# Patient Record
Sex: FEMALE
[De-identification: names, ages, dates, MRNs, and addresses within clinical notes are randomized per-mention and may not be internally consistent; named-entity substitution may affect disease eponyms.]

---

## 2024-04-05 PROBLEM — Z00.00 ENCOUNTER FOR PREVENTIVE HEALTH EXAMINATION: Status: ACTIVE | Noted: 2024-04-05

## 2024-04-21 ENCOUNTER — NON-APPOINTMENT (OUTPATIENT)
Age: 54
End: 2024-04-21

## 2024-04-22 ENCOUNTER — APPOINTMENT (OUTPATIENT)
Dept: PULMONOLOGY | Facility: CLINIC | Age: 54
End: 2024-04-22
Payer: COMMERCIAL

## 2024-04-22 VITALS
HEART RATE: 62 BPM | DIASTOLIC BLOOD PRESSURE: 71 MMHG | SYSTOLIC BLOOD PRESSURE: 109 MMHG | WEIGHT: 170 LBS | HEIGHT: 63 IN | BODY MASS INDEX: 30.12 KG/M2 | OXYGEN SATURATION: 97 %

## 2024-04-22 LAB — POCT - HEMOGLOBIN (HGB), QUANTITATIVE, TRANSCUTANEOUS: 11

## 2024-04-22 PROCEDURE — ZZZZZ: CPT

## 2024-04-22 PROCEDURE — 94010 BREATHING CAPACITY TEST: CPT

## 2024-04-22 PROCEDURE — 94727 GAS DIL/WSHOT DETER LNG VOL: CPT

## 2024-04-22 PROCEDURE — 88738 HGB QUANT TRANSCUTANEOUS: CPT

## 2024-04-22 PROCEDURE — 99204 OFFICE O/P NEW MOD 45 MIN: CPT | Mod: 25

## 2024-04-22 PROCEDURE — 94729 DIFFUSING CAPACITY: CPT

## 2024-04-22 NOTE — HISTORY OF PRESENT ILLNESS
[Never] : never [TextBox_4] : SOCORRO LUQUE is a 53 year old female who presents with  for further eval on CT colonscop virtual at [facility] no significant medical history never smoker sometimes cough due to allergies never seen pulm  no dvt /p ehistory  no URI symptoms- prior to this CT colonoscopy  no prior CT chest

## 2024-04-22 NOTE — PROCEDURE
[FreeTextEntry1] : 4/2024- normal spirometry, mild high rv/tlc, normal dlco the labs were drawn in the office today unable to do niox  previous data reviewed: IMPRESSION:    3. LEFT LOWER LOBE 4.6 X 2.6 CM WEDGE-SHAPED OPACITY WITH A FEW AIR BRONCHOGRAMS MAY REPRESENT ACUTE PNEUMONIA OR POSSIBLY PULMONARY INFARCT BUT IS NONSPECIFIC. MILDER RIGHT LOWER LOBE SUSPECTED ATELECTASIS OR SCARRING. FINDINGS ARE MORE PROMINENT COMPARED TO CHEST X-RAY 10/4/2021 AND CLINICAL CORRELATION IS RECOMMENDED. CORRELATION WITH FINDINGS ON PRIOR OUTSIDE CROSS-SECTIONAL IMAGING IS RECOMMENDED IF AVAILABLE. IF NO PRIOR CT EXAMINATIONS ARE AVAILABLE, SHORT-TERM CHEST CT FOLLOW-UP IS RECOMMENDED (6-8 WEEKS) FOLLOWING ANTIBIOTIC THERAPY IF CLINICALLY APPROPRIATE.   IMPORTANT FINDING. THIS REPORT WILL BE FLAGGED (!) IN EPIC.   1. No colonic mass or evidence of polyp measuring 6 mm or larger. Mild colonic diverticulosis.   2. Thickened appendix (1.0 cm) with small likely appendicolith may represent chronic low-grade appendicitis. No acute inflammatory changes. Correlation with findings on prior outside imaging is recommended if available. Noncontrast imaging follow-up can be performed to assess for stability.     Clinical indication: Colorectal carcinoma screening. Incomplete colonoscopy to the level of the splenic flexure due to very tortuous colon.   Volumetric scanning of the abdomen and pelvis was performed utilizing a CT colonography protocol.  A rectal tube was placed and the colon was insufflated utilizing CO2 with safety valve pressure monitoring.  Scanning was then performed from the upper abdomen through the perineum utilizing low-dose technique in both the supine and prone positions. Multiplanar reformations are submitted for review supplemented by surface rendered 3D imaging performed on an independent workstation.   Comparison: None of this type. Chest x-ray 10/4/2021   Findings: Insufflation was well tolerated; there is good overall colonic distention when combining supine and prone views.  Small amount of residual mobile fluid/fecal debris is present within the colonic lumen; bowel preparation is good. There is mild motion artifact in the upper abdomen on supine imaging.   There is no colonic mass or evidence of polyp measuring 6 mm or larger. The ileocecal valve is normal. There is mild thickening of the appendix (10 mm) without surrounding inflammatory changes. Subcentimeter calcification distally may represent small appendicolith.   There is moderate colonic redundancy along the splenic flexure and involving the sigmoid colon. Mild colonic diverticulosis most pronounced region of the splenic flexure and proximal descending colon.   Additional Evaluation of non-colonic findings is significantly limited with this dedicated low-dose and noncontrast technique: 01. LIVER: Normal unenhanced. 02. SPLEEN: Normal unenhanced.  03. PANCREAS: Normal unenhanced. 04. GALLBLADDER/BILIARY TREE: No biliary duct dilatation. No radiopaque gallstone.  05. ADRENALS: Normal. 06. KIDNEYS: Symmetric in size.  No hydronephrosis or renal calculi. 07. LYMPHADENOPATHY/RETROPERITONEUM: No lymphadenopathy. 08. BOWEL: See above. The unopacified stomach and small bowel are grossly unremarkable.  09. PELVIC VISCERA: Anteverted uterus, adnexa and unopacified urinary bladder are grossly unremarkable. 10. PELVIC LYMPH NODES: No lymphadenopathy. 11. VASCULATURE: Normal caliber abdominal aorta. 12. PERITONEUM/ABDOMINAL WALL: No ascites. Scarring along the midline pelvic ventral wall.  13. SKELETAL: No aggressive osseous lesion. L1 butterfly vertebrae. 14. LUNG BASES: Left lower lobe wedge-shaped opacity with a few air bronchograms may represent pneumonia or possibly pulmonary infarct but is nonspecific (series 7, image 11). Milder suspected scarring or atelectasis in the medial basilar right lower lobe. No pleural effusion.   Electronic Signature: I personally reviewed the images and agree with this report. Final Report: Dictated by  and Signed by Attending Beny Martinez MD 4/1/2024 4:06 PM External Result Report previous data reviewed:  Signed	Date/Time	    Phone	Pager SAMMIE BRODERICK	Oct 6, 2021	 7:44 AM	[phone]	 Full Report IMPRESSION:    No radiographic evidence for acute cardiopulmonary disease.     Clinical indication: Cough   Technique: PA and lateral views of the chest    Comparison: None   Findings:   Mild atelectasis is present at the left lung base. There is a prominent left-sided epicardial fat.   The lungs are otherwise clear bilaterally without evidence of focal consolidation, pleural effusion or pneumothorax. The trachea is midline. The cardiomediastinal silhouette is within normal limits. The pulmonary vasculature is within normal limits.  No mediastinal adenopathy is identified. There is no aggressive osseous lytic or blastic lesion.  There are multilevel degenerative changes of the thoracic spine. Visualized portions of the abdomen appear unremarkable.   Electronic Signature: I personally reviewed the images and agree with this report. Final Report: Dictated by  and Signed by Attending Sammie Broderick MD 10/6/2021 7:44 AM External Result Report Result History

## 2024-04-23 LAB — DEPRECATED D DIMER PPP IA-ACNC: 156 NG/ML DDU

## 2024-05-20 ENCOUNTER — APPOINTMENT (OUTPATIENT)
Dept: PULMONOLOGY | Facility: CLINIC | Age: 54
End: 2024-05-20
Payer: COMMERCIAL

## 2024-05-20 VITALS
HEART RATE: 76 BPM | HEIGHT: 63 IN | OXYGEN SATURATION: 95 % | RESPIRATION RATE: 16 BRPM | DIASTOLIC BLOOD PRESSURE: 73 MMHG | SYSTOLIC BLOOD PRESSURE: 123 MMHG | BODY MASS INDEX: 29.77 KG/M2 | WEIGHT: 168 LBS

## 2024-05-20 PROCEDURE — 99214 OFFICE O/P EST MOD 30 MIN: CPT

## 2024-05-20 NOTE — PROCEDURE
[FreeTextEntry1] : 5/2024- [facility] CT chest miconodularity b/l perilymphatic, consolidation LLL lymph nodes also don/mediastinal   previous data reviewed:  4/2024- normal spirometry, mild high rv/tlc, normal dlco the labs were drawn in the office today unable to do niox  IMPRESSION:    3. LEFT LOWER LOBE 4.6 X 2.6 CM WEDGE-SHAPED OPACITY WITH A FEW AIR BRONCHOGRAMS MAY REPRESENT ACUTE PNEUMONIA OR POSSIBLY PULMONARY INFARCT BUT IS NONSPECIFIC. MILDER RIGHT LOWER LOBE SUSPECTED ATELECTASIS OR SCARRING. FINDINGS ARE MORE PROMINENT COMPARED TO CHEST X-RAY 10/4/2021 AND CLINICAL CORRELATION IS RECOMMENDED. CORRELATION WITH FINDINGS ON PRIOR OUTSIDE CROSS-SECTIONAL IMAGING IS RECOMMENDED IF AVAILABLE. IF NO PRIOR CT EXAMINATIONS ARE AVAILABLE, SHORT-TERM CHEST CT FOLLOW-UP IS RECOMMENDED (6-8 WEEKS) FOLLOWING ANTIBIOTIC THERAPY IF CLINICALLY APPROPRIATE.   IMPORTANT FINDING. THIS REPORT WILL BE FLAGGED (!) IN EPIC.   1. No colonic mass or evidence of polyp measuring 6 mm or larger. Mild colonic diverticulosis.   2. Thickened appendix (1.0 cm) with small likely appendicolith may represent chronic low-grade appendicitis. No acute inflammatory changes. Correlation with findings on prior outside imaging is recommended if available. Noncontrast imaging follow-up can be performed to assess for stability.     Clinical indication: Colorectal carcinoma screening. Incomplete colonoscopy to the level of the splenic flexure due to very tortuous colon.   Volumetric scanning of the abdomen and pelvis was performed utilizing a CT colonography protocol.  A rectal tube was placed and the colon was insufflated utilizing CO2 with safety valve pressure monitoring.  Scanning was then performed from the upper abdomen through the perineum utilizing low-dose technique in both the supine and prone positions. Multiplanar reformations are submitted for review supplemented by surface rendered 3D imaging performed on an independent workstation.   Comparison: None of this type. Chest x-ray 10/4/2021   Findings: Insufflation was well tolerated; there is good overall colonic distention when combining supine and prone views.  Small amount of residual mobile fluid/fecal debris is present within the colonic lumen; bowel preparation is good. There is mild motion artifact in the upper abdomen on supine imaging.   There is no colonic mass or evidence of polyp measuring 6 mm or larger. The ileocecal valve is normal. There is mild thickening of the appendix (10 mm) without surrounding inflammatory changes. Subcentimeter calcification distally may represent small appendicolith.   There is moderate colonic redundancy along the splenic flexure and involving the sigmoid colon. Mild colonic diverticulosis most pronounced region of the splenic flexure and proximal descending colon.   Additional Evaluation of non-colonic findings is significantly limited with this dedicated low-dose and noncontrast technique: 01. LIVER: Normal unenhanced. 02. SPLEEN: Normal unenhanced.  03. PANCREAS: Normal unenhanced. 04. GALLBLADDER/BILIARY TREE: No biliary duct dilatation. No radiopaque gallstone.  05. ADRENALS: Normal. 06. KIDNEYS: Symmetric in size.  No hydronephrosis or renal calculi. 07. LYMPHADENOPATHY/RETROPERITONEUM: No lymphadenopathy. 08. BOWEL: See above. The unopacified stomach and small bowel are grossly unremarkable.  09. PELVIC VISCERA: Anteverted uterus, adnexa and unopacified urinary bladder are grossly unremarkable. 10. PELVIC LYMPH NODES: No lymphadenopathy. 11. VASCULATURE: Normal caliber abdominal aorta. 12. PERITONEUM/ABDOMINAL WALL: No ascites. Scarring along the midline pelvic ventral wall.  13. SKELETAL: No aggressive osseous lesion. L1 butterfly vertebrae. 14. LUNG BASES: Left lower lobe wedge-shaped opacity with a few air bronchograms may represent pneumonia or possibly pulmonary infarct but is nonspecific (series 7, image 11). Milder suspected scarring or atelectasis in the medial basilar right lower lobe. No pleural effusion.   Electronic Signature: I personally reviewed the images and agree with this report. Final Report: Dictated by  and Signed by Attending Beny Martinez MD 4/1/2024 4:06 PM External Result Report previous data reviewed:  Signed	Date/Time	    Phone	Pager SAMMIE BRODERICK	Oct 6, 2021	 7:44 AM	[phone]	 Full Report IMPRESSION:    No radiographic evidence for acute cardiopulmonary disease.     Clinical indication: Cough   Technique: PA and lateral views of the chest    Comparison: None   Findings:   Mild atelectasis is present at the left lung base. There is a prominent left-sided epicardial fat.   The lungs are otherwise clear bilaterally without evidence of focal consolidation, pleural effusion or pneumothorax. The trachea is midline. The cardiomediastinal silhouette is within normal limits. The pulmonary vasculature is within normal limits.  No mediastinal adenopathy is identified. There is no aggressive osseous lytic or blastic lesion.  There are multilevel degenerative changes of the thoracic spine. Visualized portions of the abdomen appear unremarkable.   Electronic Signature: I personally reviewed the images and agree with this report. Final Report: Dictated by  and Signed by Attending Sammie Broderick MD 10/6/2021 7:44 AM External Result Report Result History

## 2024-05-20 NOTE — HISTORY OF PRESENT ILLNESS
[Never] : never [TextBox_4] : SOCORRO LUQUE is a 53 year old female who presents with  for f/u on repeat CT chest she had a cT colonscopy done- abnormal chest findings  we repeated CT same facility here for result review   remains w/o resp complaints

## 2024-05-21 ENCOUNTER — NON-APPOINTMENT (OUTPATIENT)
Age: 54
End: 2024-05-21

## 2024-05-24 PROBLEM — R93.89 ABNORMAL CAT SCAN: Status: ACTIVE | Noted: 2024-04-22

## 2024-05-28 ENCOUNTER — APPOINTMENT (OUTPATIENT)
Dept: THORACIC SURGERY | Facility: CLINIC | Age: 54
End: 2024-05-28
Payer: COMMERCIAL

## 2024-05-28 VITALS
HEART RATE: 77 BPM | SYSTOLIC BLOOD PRESSURE: 123 MMHG | DIASTOLIC BLOOD PRESSURE: 73 MMHG | HEIGHT: 63 IN | RESPIRATION RATE: 16 BRPM | WEIGHT: 168 LBS | OXYGEN SATURATION: 96 % | BODY MASS INDEX: 29.77 KG/M2

## 2024-05-28 DIAGNOSIS — R93.89 ABNORMAL FINDINGS ON DIAGNOSTIC IMAGING OF OTHER SPECIFIED BODY STRUCTURES: ICD-10-CM

## 2024-05-28 DIAGNOSIS — R59.0 LOCALIZED ENLARGED LYMPH NODES: ICD-10-CM

## 2024-05-28 PROCEDURE — 99204 OFFICE O/P NEW MOD 45 MIN: CPT

## 2024-05-28 NOTE — PHYSICAL EXAM
[Fully active, able to carry on all pre-disease performance without restriction] : Status 0 - Fully active, able to carry on all pre-disease performance without restriction [General Appearance - Alert] : alert [General Appearance - In No Acute Distress] : in no acute distress [Sclera] : the sclera and conjunctiva were normal [Outer Ear] : the ears and nose were normal in appearance [Hearing Threshold Finger Rub Not [place]] : hearing was normal [Neck Appearance] : the appearance of the neck was normal [Auscultation Breath Sounds / Voice Sounds] : lungs were clear to auscultation bilaterally [Heart Rate And Rhythm] : heart rate was normal and rhythm regular [Examination Of The Chest] : the chest was normal in appearance [Chest Visual Inspection Thoracic Asymmetry] : no chest asymmetry [Diminished Respiratory Excursion] : normal chest expansion [2+] : left 2+ [Bowel Sounds] : normal bowel sounds [Abdomen Soft] : soft [Cervical Lymph Nodes Enlarged Posterior Bilaterally] : posterior cervical [Cervical Lymph Nodes Enlarged Anterior Bilaterally] : anterior cervical [No CVA Tenderness] : no ~M costovertebral angle tenderness [No Spinal Tenderness] : no spinal tenderness [Abnormal Walk] : normal gait [Nail Clubbing] : no clubbing  or cyanosis of the fingernails [Musculoskeletal - Swelling] : no joint swelling seen [Motor Tone] : muscle strength and tone were normal [Skin Color & Pigmentation] : normal skin color and pigmentation [Skin Turgor] : normal skin turgor [] : no rash [Deep Tendon Reflexes (DTR)] : deep tendon reflexes were 2+ and symmetric [Sensation] : the sensory exam was normal to light touch and pinprick [No Focal Deficits] : no focal deficits [Oriented To Time, Place, And Person] : oriented to person, place, and time [Impaired Insight] : insight and judgment were intact [Affect] : the affect was normal

## 2024-05-29 NOTE — CONSULT LETTER
[Dear  ___] : Dear  [unfilled], [Consult Letter:] : I had the pleasure of evaluating your patient, [unfilled]. [Please see my note below.] : Please see my note below. [Consult Closing:] : Thank you very much for allowing me to participate in the care of this patient.  If you have any questions, please do not hesitate to contact me. [Sincerely,] : Sincerely, [FreeTextEntry2] : Larry Kirkland DO (ref/pulm) [FreeTextEntry3] : Cynthia Flores MD Attending Surgeon Division of Thoracic Surgery , [facility] [employer] [facility]

## 2024-05-29 NOTE — HISTORY OF PRESENT ILLNESS
[FreeTextEntry1] : Ms. SOCORRO LUQUE, 53 year old female, never smoker, with no PMhx  who presented lung biopsy, suspect of sarcoidosis.   PFTs on 4/22/24: FVC 96%, FEV1 98%, DLCO 82%  CT chest on 3/25/2024: - left lower lobe 4.6x2.6cm wedge shaped opacity with a few air bronchograms may represent pneumonia or possibly pulmonary infract but is nonspecific. - milder right lower lobe suspected atelectasis or scarring.   CT Chest on 5/13/24: - scattered areas of perilymphatic micronodularity are identified bilaterally, involving RUL to a lesser extent the TATIANNA, with worse involvement of lower lobes in which there is associated interlocular septal thickening and a coalescent area of patchy consolidation in the LLL - numerous small calcified bilateral hilar and mediastinal LNs  Pt presents today for CT Sx consultation, referred by . Patient reports of productive cough, denies any SOB or CP.

## 2024-05-29 NOTE — ASSESSMENT
[FreeTextEntry1] : Ms. SOCORRO LUQUE, 53 year old female, never smoker, with no PMhx  who presented lung biopsy, suspect of sarcoidosis.   PFTs on 4/22/24: FVC 96%, FEV1 98%, DLCO 82%  CT chest on 3/25/2024: - left lower lobe 4.6x2.6cm wedge shaped opacity with a few air bronchograms may represent pneumonia or possibly pulmonary infract but is nonspecific. - milder right lower lobe suspected atelectasis or scarring.   CT Chest on 5/13/24: - scattered areas of perilymphatic micronodularity are identified bilaterally, involving RUL to a lesser extent the TATIANNA, with worse involvement of lower lobes in which there is associated interlocular septal thickening and a coalescent area of patchy consolidation in the LLL - numerous small calcified bilateral hilar and mediastinal LNs  I have reviewed the patient's medical records and diagnostic images at time of this office consultation and have made the following recommendation: 1. CT chest reviewed and discussed with patient, lung nodules and mediastinal lymph nodes with unclear etiology. Recommended R.A Rt. VATS wedge biopsy and mediastinal lymph node biopsy for diagnostic and therapeurtic purposes. Risks of surgery includes but not limited to pain, infection, bleeding, injuries to surrounding structures, and need for further procedure, stroke or death. Benefits and alternatives explained to patient, all questions answered, patient agreed to proceed with surgery. 2. Cardiac clearance and PST prior to surgery.   PREOPERATIVE CHECKLIST: (Discussed with patient) - Confirm allergies, including latex: NKA - Confirm pacemaker: none - Anticoagulation/antiplatelets noted and will be discontinued/continued: none - SGLT-2 Inhibitors (discontinued 3 days prior to surgery) or GLP-1 (discontinued 1 week prior to surgery): none - All other supplements, NSAIDs and fish oil were discussed and will be held one week before surgery.   I, Dr. PULLIAM, SAEID DELANEY, personally performed the evaluation and management (E/M) services for this new patient who presents today with (a) new problem(s)/exacerbation of (an) existing condition(s).  That E/M includes conducting the examination, assessing all new/exacerbated conditions, and establishing a new plan of care.  Today, my ACP, RAY Ruiz, was here to observe my evaluation and management services for this new problem/exacerbated condition to be followed going forward.

## 2024-06-14 ENCOUNTER — NON-APPOINTMENT (OUTPATIENT)
Age: 54
End: 2024-06-14

## 2024-06-28 ENCOUNTER — NON-APPOINTMENT (OUTPATIENT)
Age: 54
End: 2024-06-28

## 2024-08-12 ENCOUNTER — APPOINTMENT (OUTPATIENT)
Dept: NUCLEAR MEDICINE | Facility: CLINIC | Age: 54
End: 2024-08-12
Payer: COMMERCIAL

## 2024-08-12 PROCEDURE — A9552: CPT

## 2024-08-12 PROCEDURE — 78815 PET IMAGE W/CT SKULL-THIGH: CPT | Mod: PI

## 2024-08-22 ENCOUNTER — NON-APPOINTMENT (OUTPATIENT)
Age: 54
End: 2024-08-22

## 2024-08-23 ENCOUNTER — RESULT REVIEW (OUTPATIENT)
Age: 54
End: 2024-08-23

## 2024-08-23 ENCOUNTER — APPOINTMENT (OUTPATIENT)
Dept: THORACIC SURGERY | Facility: HOSPITAL | Age: 54
End: 2024-08-23

## 2024-08-23 ENCOUNTER — INPATIENT (INPATIENT)
Facility: HOSPITAL | Age: 54
LOS: 4 days | Discharge: HOME | End: 2024-08-28
Attending: STUDENT IN AN ORGANIZED HEALTH CARE EDUCATION/TRAINING PROGRAM | Admitting: STUDENT IN AN ORGANIZED HEALTH CARE EDUCATION/TRAINING PROGRAM
Payer: COMMERCIAL

## 2024-08-23 VITALS
SYSTOLIC BLOOD PRESSURE: 118 MMHG | TEMPERATURE: 99 F | DIASTOLIC BLOOD PRESSURE: 77 MMHG | RESPIRATION RATE: 14 BRPM | WEIGHT: 171.96 LBS | HEIGHT: 63 IN | OXYGEN SATURATION: 98 % | HEART RATE: 69 BPM

## 2024-08-23 DIAGNOSIS — R59.0 LOCALIZED ENLARGED LYMPH NODES: ICD-10-CM

## 2024-08-23 DIAGNOSIS — Z98.891 HISTORY OF UTERINE SCAR FROM PREVIOUS SURGERY: Chronic | ICD-10-CM

## 2024-08-23 LAB
GLUCOSE BLDC GLUCOMTR-MCNC: 122 MG/DL — HIGH (ref 70–99)
GLUCOSE BLDC GLUCOMTR-MCNC: 134 MG/DL — HIGH (ref 70–99)
GLUCOSE BLDC GLUCOMTR-MCNC: 139 MG/DL — HIGH (ref 70–99)
GLUCOSE BLDC GLUCOMTR-MCNC: 148 MG/DL — HIGH (ref 70–99)
GRAM STN FLD: SIGNIFICANT CHANGE UP
NIGHT BLUE STAIN TISS: SIGNIFICANT CHANGE UP
SPECIMEN SOURCE: SIGNIFICANT CHANGE UP

## 2024-08-23 PROCEDURE — 32674 THORACOSCOPY LYMPH NODE EXC: CPT

## 2024-08-23 PROCEDURE — 88291 CYTO/MOLECULAR REPORT: CPT

## 2024-08-23 PROCEDURE — 32667 THORACOSCOPY W/W RESECT ADDL: CPT | Mod: RT

## 2024-08-23 PROCEDURE — 71045 X-RAY EXAM CHEST 1 VIEW: CPT | Mod: 26

## 2024-08-23 PROCEDURE — 88305 TISSUE EXAM BY PATHOLOGIST: CPT | Mod: 26

## 2024-08-23 PROCEDURE — 88189 FLOWCYTOMETRY/READ 16 & >: CPT

## 2024-08-23 PROCEDURE — 32674 THORACOSCOPY LYMPH NODE EXC: CPT | Mod: AS

## 2024-08-23 PROCEDURE — 32666 THORACOSCOPY W/WEDGE RESECT: CPT | Mod: RT

## 2024-08-23 PROCEDURE — 88307 TISSUE EXAM BY PATHOLOGIST: CPT | Mod: 26

## 2024-08-23 PROCEDURE — 32666 THORACOSCOPY W/WEDGE RESECT: CPT | Mod: AS,RT

## 2024-08-23 PROCEDURE — 88312 SPECIAL STAINS GROUP 1: CPT | Mod: 26

## 2024-08-23 PROCEDURE — 32667 THORACOSCOPY W/W RESECT ADDL: CPT | Mod: AS,RT

## 2024-08-23 PROCEDURE — S2900 ROBOTIC SURGICAL SYSTEM: CPT | Mod: NC

## 2024-08-23 DEVICE — CHEST DRAIN THORACIC ARGYLE PVC 28FR STRAIGHT: Type: IMPLANTABLE DEVICE | Status: FUNCTIONAL

## 2024-08-23 DEVICE — SURGICEL 2 X 14": Type: IMPLANTABLE DEVICE | Status: FUNCTIONAL

## 2024-08-23 DEVICE — LIGATING CLIPS WECK HEMOLOK POLYMER MEDIUM-LARGE (GREEN) 6: Type: IMPLANTABLE DEVICE | Status: FUNCTIONAL

## 2024-08-23 DEVICE — STAPLER COVIDIEN TRI-STAPLE 45MM BLACK INTELLIGENT RELOAD: Type: IMPLANTABLE DEVICE | Status: FUNCTIONAL

## 2024-08-23 DEVICE — STAPLER COVIDIEN TRI-STAPLE 60MM BLACK INTELLIGENT RELOAD: Type: IMPLANTABLE DEVICE | Status: FUNCTIONAL

## 2024-08-23 DEVICE — STAPLER COVIDIEN TRI-STAPLE 45MM PURPLE INTELLIGENT RELOAD: Type: IMPLANTABLE DEVICE | Status: FUNCTIONAL

## 2024-08-23 RX ORDER — ONDANSETRON 2 MG/ML
4 INJECTION, SOLUTION INTRAMUSCULAR; INTRAVENOUS ONCE
Refills: 0 | Status: DISCONTINUED | OUTPATIENT
Start: 2024-08-23 | End: 2024-08-23

## 2024-08-23 RX ORDER — HYDROMORPHONE HYDROCHLORIDE 2 MG/1
30 TABLET ORAL
Refills: 0 | Status: DISCONTINUED | OUTPATIENT
Start: 2024-08-23 | End: 2024-08-24

## 2024-08-23 RX ORDER — OXYCODONE HYDROCHLORIDE 5 MG/1
5 TABLET ORAL ONCE
Refills: 0 | Status: DISCONTINUED | OUTPATIENT
Start: 2024-08-23 | End: 2024-08-23

## 2024-08-23 RX ORDER — GABAPENTIN 100 MG
300 CAPSULE ORAL ONCE
Refills: 0 | Status: COMPLETED | OUTPATIENT
Start: 2024-08-23 | End: 2024-08-23

## 2024-08-23 RX ORDER — HYDROMORPHONE HYDROCHLORIDE 2 MG/1
30 TABLET ORAL
Refills: 0 | Status: DISCONTINUED | OUTPATIENT
Start: 2024-08-23 | End: 2024-08-23

## 2024-08-23 RX ORDER — ONDANSETRON 2 MG/ML
4 INJECTION, SOLUTION INTRAMUSCULAR; INTRAVENOUS EVERY 6 HOURS
Refills: 0 | Status: DISCONTINUED | OUTPATIENT
Start: 2024-08-23 | End: 2024-08-28

## 2024-08-23 RX ORDER — SENNA 187 MG
2 TABLET ORAL AT BEDTIME
Refills: 0 | Status: DISCONTINUED | OUTPATIENT
Start: 2024-08-23 | End: 2024-08-28

## 2024-08-23 RX ORDER — HYDROMORPHONE HYDROCHLORIDE 2 MG/1
1 TABLET ORAL
Refills: 0 | Status: DISCONTINUED | OUTPATIENT
Start: 2024-08-23 | End: 2024-08-23

## 2024-08-23 RX ORDER — NALOXONE HCL 1 MG/ML
0.1 VIAL (ML) INJECTION
Refills: 0 | Status: DISCONTINUED | OUTPATIENT
Start: 2024-08-23 | End: 2024-08-23

## 2024-08-23 RX ORDER — HEPARIN SODIUM,BOVINE 1000/ML
5000 VIAL (ML) INJECTION EVERY 8 HOURS
Refills: 0 | Status: DISCONTINUED | OUTPATIENT
Start: 2024-08-23 | End: 2024-08-28

## 2024-08-23 RX ORDER — NALOXONE HCL 1 MG/ML
0.1 VIAL (ML) INJECTION
Refills: 0 | Status: DISCONTINUED | OUTPATIENT
Start: 2024-08-23 | End: 2024-08-28

## 2024-08-23 RX ORDER — NALBUPHINE HYDROCHLORIDE 10 MG/ML
2.5 INJECTION, SOLUTION INTRAMUSCULAR; INTRAVENOUS; SUBCUTANEOUS EVERY 6 HOURS
Refills: 0 | Status: DISCONTINUED | OUTPATIENT
Start: 2024-08-23 | End: 2024-08-23

## 2024-08-23 RX ORDER — HYDROMORPHONE HYDROCHLORIDE 2 MG/1
0.5 TABLET ORAL
Refills: 0 | Status: DISCONTINUED | OUTPATIENT
Start: 2024-08-23 | End: 2024-08-24

## 2024-08-23 RX ORDER — ACETAMINOPHEN 325 MG/1
975 TABLET ORAL ONCE
Refills: 0 | Status: COMPLETED | OUTPATIENT
Start: 2024-08-23 | End: 2024-08-23

## 2024-08-23 RX ORDER — HEPARIN SODIUM,BOVINE 1000/ML
5000 VIAL (ML) INJECTION ONCE
Refills: 0 | Status: COMPLETED | OUTPATIENT
Start: 2024-08-23 | End: 2024-08-23

## 2024-08-23 RX ORDER — POLYETHYLENE GLYCOL 3350 17 G/17G
17 POWDER, FOR SOLUTION ORAL DAILY
Refills: 0 | Status: DISCONTINUED | OUTPATIENT
Start: 2024-08-24 | End: 2024-08-28

## 2024-08-23 RX ORDER — ONDANSETRON 2 MG/ML
4 INJECTION, SOLUTION INTRAMUSCULAR; INTRAVENOUS EVERY 6 HOURS
Refills: 0 | Status: DISCONTINUED | OUTPATIENT
Start: 2024-08-23 | End: 2024-08-23

## 2024-08-23 RX ORDER — ACETAMINOPHEN 325 MG/1
1000 TABLET ORAL EVERY 6 HOURS
Refills: 0 | Status: COMPLETED | OUTPATIENT
Start: 2024-08-23 | End: 2024-08-24

## 2024-08-23 RX ORDER — HYDROMORPHONE HYDROCHLORIDE 2 MG/1
0.5 TABLET ORAL
Refills: 0 | Status: DISCONTINUED | OUTPATIENT
Start: 2024-08-23 | End: 2024-08-23

## 2024-08-23 RX ADMIN — Medication 30 MILLILITER(S): at 06:19

## 2024-08-23 RX ADMIN — HYDROMORPHONE HYDROCHLORIDE 30 MILLILITER(S): 2 TABLET ORAL at 19:13

## 2024-08-23 RX ADMIN — Medication 30 MILLILITER(S): at 10:37

## 2024-08-23 RX ADMIN — ACETAMINOPHEN 400 MILLIGRAM(S): 325 TABLET ORAL at 13:34

## 2024-08-23 RX ADMIN — HYDROMORPHONE HYDROCHLORIDE 30 MILLILITER(S): 2 TABLET ORAL at 15:42

## 2024-08-23 RX ADMIN — Medication 5000 UNIT(S): at 13:57

## 2024-08-23 RX ADMIN — HYDROMORPHONE HYDROCHLORIDE 30 MILLILITER(S): 2 TABLET ORAL at 10:36

## 2024-08-23 RX ADMIN — Medication 5000 UNIT(S): at 06:19

## 2024-08-23 RX ADMIN — Medication 5000 UNIT(S): at 22:58

## 2024-08-23 RX ADMIN — Medication 300 MILLIGRAM(S): at 06:20

## 2024-08-23 RX ADMIN — ACETAMINOPHEN 975 MILLIGRAM(S): 325 TABLET ORAL at 06:20

## 2024-08-23 RX ADMIN — ACETAMINOPHEN 1000 MILLIGRAM(S): 325 TABLET ORAL at 13:58

## 2024-08-23 NOTE — DISCHARGE NOTE PROVIDER - NSDCFUADDAPPT_GEN_ALL_CORE_FT
Follow up with Dr. Flores in 2 weeks   Chest x-ray prior to appointment with Dr. Flores   Follow up with primary care provider in one week  Follow up with Dr. Flores in 2 weeks. Obtain a new Chest x-ray prior to appointment with Dr. Flores. Please call for an appointment [phone].    Follow up with primary care provider in one week

## 2024-08-23 NOTE — DISCHARGE NOTE PROVIDER - CARE PROVIDER_API CALL
Cynthia Flores  Thoracic Surgery  [id number] [place] [address], [address] [place] [address]  Phone: [phone]  Fax: [phone]  Follow Up Time:

## 2024-08-23 NOTE — DISCHARGE NOTE PROVIDER - HOSPITAL COURSE
54 yr old female with  medical hx  T2DM no meds presents for preop evaluation with dx of Localized Enlarged Lymph Nodes.  Patient reported that on her work-up for colonoscopy she went for cxr which resulted abnormal and she was then sent for CT scan.    CT chest on 3/25/2024:  - left lower lobe 4.6x2.6cm wedge shaped opacity with a few air bronchograms may represent pneumonia or possibly pulmonary infract but is nonspecific.  - milder right lower lobe suspected atelectasis or scarring.  ?  CT Chest on 5/13/24:  - scattered areas of perilymphatic micronodularity are identified bilaterally, involving RUL to a lesser extent the TATIANNA, with worse involvement of lower lobes in which there is associated interlocular septal thickening and a coalescent area of patchy consolidation in the LLL  - numerous small calcified bilateral hilar and mediastinal LNs  Patient s/p flex bronch, robotic assisted right Vats, right upper and lower lobe wedge resection and MLND on 8/23/24.  Post op course without complication, patient stable for discharge home when chest tube removed. 54 yr old female with  medical hx  T2DM no meds is now s/p flex bronch, robotic assisted right Vats, right upper and lower lobe wedge resection and MLND on 8/23/24. Post op course without complication, and pt's chest tube was removed on POD1 with follow up CXR reviewed. At this point, the patient was deemed stable for discharge home with continued outpatient follow up, per Dr. Flores. 54 yr old female with  medical hx  T2DM no meds is now s/p flex bronch, robotic assisted right Vats, right upper and lower lobe wedge resection and MLND on 8/23/24. Post op course without complication, and pt's chest tube was removed on POD1. Pt remained in the hospital for pain control, pulmonary toileting and bowel regimen. She was optimized. Seen and examined by thoracic team on day of discharge and presented to attendings on TEAMS report. The patient was deemed stable for discharge home with continued outpatient follow up.    Vital Signs Last 24 Hrs  T(C): 37.1 (28 Aug 2024 04:32), Max: 37.3 (28 Aug 2024 00:45)  T(F): 98.7 (28 Aug 2024 04:32), Max: 99.1 (28 Aug 2024 00:45)  HR: 84 (28 Aug 2024 04:32) (70 - 98)  BP: 105/61 (28 Aug 2024 04:32) (104/56 - 128/69)  BP(mean): --  RR: 18 (28 Aug 2024 04:32) (18 - 18)  SpO2: 93% (28 Aug 2024 04:32) (93% - 99%)    Parameters below as of 28 Aug 2024 04:32  Patient On (Oxygen Delivery Method): room air    PHYSICAL EXAM:  Gen: NAD  Resp: Respirations unlabored. Bilateral chest rise.   Card: RRR.   GI: Soft. Nontender. Nondistended.   Skin: Warm, well perfused, no masses or organomegaly  EXT: No clubbing, cyanosis, or edema  site c,d,i

## 2024-08-23 NOTE — DISCHARGE NOTE PROVIDER - NSDCFUADDINST_GEN_ALL_CORE_FT
Please walk 4-5 x per day; increase as tolerated. You may climb stairs. Continue to use the incentive spirometer.   You may keep wounds uncovered. Please shower daily with soap and water. The suture will be removed in the office at the follow up appointment.   Please call the office at [phone] if you have fevers, chills, worsening shortness of breath, chest pain, warmth, redness or purulent discharge from the wound.

## 2024-08-23 NOTE — BRIEF OPERATIVE NOTE - NSICDXBRIEFPROCEDURE_GEN_ALL_CORE_FT
PROCEDURES:  Wedge resection, lung, robot-assisted, using VATS 23-Aug-2024 10:01:05  Mercedes Bond

## 2024-08-23 NOTE — DISCHARGE NOTE PROVIDER - NSDCCPCAREPLAN_GEN_ALL_CORE_FT
PRINCIPAL DISCHARGE DIAGNOSIS  Diagnosis: Localized enlarged lymph nodes  Assessment and Plan of Treatment:

## 2024-08-23 NOTE — DISCHARGE NOTE PROVIDER - NSDCMRMEDTOKEN_GEN_ALL_CORE_FT
Iron 1 tab once daily:   Vitamin C 1 tab daily:   Vitamin D3 1 tab once daily:   Zinc 1 tab once daily:    Iron 1 tab once daily:   polyethylene glycol 3350 oral powder for reconstitution: 17 gram(s) orally once a day  senna leaf extract oral tablet: 2 tab(s) orally once a day (at bedtime)  Vitamin C 1 tab daily:   Vitamin D3 1 tab once daily:   Zinc 1 tab once daily:    acetaminophen 325 mg oral tablet: 2 tab(s) orally every 6 hours As needed Temp greater or equal to 38C (100.4F), Mild Pain (1 - 3), Moderate Pain (4 - 6)  Iron 1 tab once daily:   oxyCODONE 5 mg oral tablet: 1 tab(s) orally every 4 hours as needed for  severe pain MDD: 6  PA/Lat Chest Xray: PA/Lat Chest Xray  Dx: s/p RVATS, RUL Wedge Resection, RLL Wedge Resection, MLND  ICD10: R91.8  Please fax results to Dr. Flores [phone]  polyethylene glycol 3359 oral powder for reconstitution: 17 gram(s) orally once a day  senna leaf extract oral tablet: 2 tab(s) orally once a day (at bedtime)  Vitamin C 1 tab daily:   Vitamin D3 1 tab once daily:   Zinc 1 tab once daily:

## 2024-08-24 LAB
ANION GAP SERPL CALC-SCNC: 11 MMOL/L — SIGNIFICANT CHANGE UP (ref 7–14)
BUN SERPL-MCNC: 17 MG/DL — SIGNIFICANT CHANGE UP (ref 7–23)
CALCIUM SERPL-MCNC: 8.8 MG/DL — SIGNIFICANT CHANGE UP (ref 8.4–10.5)
CHLORIDE SERPL-SCNC: 98 MMOL/L — SIGNIFICANT CHANGE UP (ref 98–107)
CO2 SERPL-SCNC: 23 MMOL/L — SIGNIFICANT CHANGE UP (ref 22–31)
CREAT SERPL-MCNC: 0.73 MG/DL — SIGNIFICANT CHANGE UP (ref 0.5–1.3)
EGFR: 99 ML/MIN/1.73M2 — SIGNIFICANT CHANGE UP
GLUCOSE BLDC GLUCOMTR-MCNC: 105 MG/DL — HIGH (ref 70–99)
GLUCOSE BLDC GLUCOMTR-MCNC: 114 MG/DL — HIGH (ref 70–99)
GLUCOSE BLDC GLUCOMTR-MCNC: 120 MG/DL — HIGH (ref 70–99)
GLUCOSE BLDC GLUCOMTR-MCNC: 123 MG/DL — HIGH (ref 70–99)
GLUCOSE SERPL-MCNC: 102 MG/DL — HIGH (ref 70–99)
HCT VFR BLD CALC: 30.5 % — LOW (ref 34.5–45)
HGB BLD-MCNC: 9.5 G/DL — LOW (ref 11.5–15.5)
MAGNESIUM SERPL-MCNC: 1.8 MG/DL — SIGNIFICANT CHANGE UP (ref 1.6–2.6)
MCHC RBC-ENTMCNC: 21.6 PG — LOW (ref 27–34)
MCHC RBC-ENTMCNC: 31.1 GM/DL — LOW (ref 32–36)
MCV RBC AUTO: 69.5 FL — LOW (ref 80–100)
NRBC # BLD: 0 /100 WBCS — SIGNIFICANT CHANGE UP (ref 0–0)
NRBC # FLD: 0 K/UL — SIGNIFICANT CHANGE UP (ref 0–0)
PHOSPHATE SERPL-MCNC: 3.2 MG/DL — SIGNIFICANT CHANGE UP (ref 2.5–4.5)
PLATELET # BLD AUTO: 170 K/UL — SIGNIFICANT CHANGE UP (ref 150–400)
POTASSIUM SERPL-MCNC: 4.2 MMOL/L — SIGNIFICANT CHANGE UP (ref 3.5–5.3)
POTASSIUM SERPL-SCNC: 4.2 MMOL/L — SIGNIFICANT CHANGE UP (ref 3.5–5.3)
RBC # BLD: 4.39 M/UL — SIGNIFICANT CHANGE UP (ref 3.8–5.2)
RBC # FLD: 16.1 % — HIGH (ref 10.3–14.5)
SODIUM SERPL-SCNC: 132 MMOL/L — LOW (ref 135–145)
WBC # BLD: 6.09 K/UL — SIGNIFICANT CHANGE UP (ref 3.8–10.5)
WBC # FLD AUTO: 6.09 K/UL — SIGNIFICANT CHANGE UP (ref 3.8–10.5)

## 2024-08-24 PROCEDURE — 71045 X-RAY EXAM CHEST 1 VIEW: CPT | Mod: 26,76

## 2024-08-24 RX ORDER — OXYCODONE HYDROCHLORIDE 5 MG/1
10 TABLET ORAL
Refills: 0 | Status: DISCONTINUED | OUTPATIENT
Start: 2024-08-24 | End: 2024-08-28

## 2024-08-24 RX ORDER — DORNASE ALFA 1 MG/ML
2.5 SOLUTION RESPIRATORY (INHALATION) DAILY
Refills: 0 | Status: DISCONTINUED | OUTPATIENT
Start: 2024-08-24 | End: 2024-08-28

## 2024-08-24 RX ORDER — POLYETHYLENE GLYCOL 3350 17 G/17G
17 POWDER, FOR SOLUTION ORAL
Qty: 0 | Refills: 0 | DISCHARGE
Start: 2024-08-24

## 2024-08-24 RX ORDER — OXYCODONE HYDROCHLORIDE 5 MG/1
1 TABLET ORAL
Qty: 30 | Refills: 0
Start: 2024-08-24 | End: 2024-08-28

## 2024-08-24 RX ORDER — LIDOCAINE/BENZALKONIUM/ALCOHOL
1 SOLUTION, NON-ORAL TOPICAL EVERY 24 HOURS
Refills: 0 | Status: DISCONTINUED | OUTPATIENT
Start: 2024-08-24 | End: 2024-08-28

## 2024-08-24 RX ORDER — SODIUM CHLORIDE 9 MG/ML
4 INJECTION INTRAMUSCULAR; INTRAVENOUS; SUBCUTANEOUS EVERY 6 HOURS
Refills: 0 | Status: DISCONTINUED | OUTPATIENT
Start: 2024-08-24 | End: 2024-08-28

## 2024-08-24 RX ORDER — SENNA 187 MG
2 TABLET ORAL
Qty: 0 | Refills: 0 | DISCHARGE
Start: 2024-08-24

## 2024-08-24 RX ORDER — ACETAMINOPHEN 325 MG/1
650 TABLET ORAL EVERY 6 HOURS
Refills: 0 | Status: DISCONTINUED | OUTPATIENT
Start: 2024-08-24 | End: 2024-08-25

## 2024-08-24 RX ORDER — OXYCODONE HYDROCHLORIDE 5 MG/1
5 TABLET ORAL
Refills: 0 | Status: DISCONTINUED | OUTPATIENT
Start: 2024-08-24 | End: 2024-08-28

## 2024-08-24 RX ADMIN — Medication 5000 UNIT(S): at 05:44

## 2024-08-24 RX ADMIN — POLYETHYLENE GLYCOL 3350 17 GRAM(S): 17 POWDER, FOR SOLUTION ORAL at 12:48

## 2024-08-24 RX ADMIN — ACETAMINOPHEN 1000 MILLIGRAM(S): 325 TABLET ORAL at 00:30

## 2024-08-24 RX ADMIN — ACETAMINOPHEN 400 MILLIGRAM(S): 325 TABLET ORAL at 05:41

## 2024-08-24 RX ADMIN — HYDROMORPHONE HYDROCHLORIDE 30 MILLILITER(S): 2 TABLET ORAL at 07:46

## 2024-08-24 RX ADMIN — Medication 5000 UNIT(S): at 21:58

## 2024-08-24 RX ADMIN — ACETAMINOPHEN 400 MILLIGRAM(S): 325 TABLET ORAL at 00:05

## 2024-08-24 RX ADMIN — ACETAMINOPHEN 650 MILLIGRAM(S): 325 TABLET ORAL at 18:23

## 2024-08-24 RX ADMIN — ACETAMINOPHEN 650 MILLIGRAM(S): 325 TABLET ORAL at 19:00

## 2024-08-24 RX ADMIN — ACETAMINOPHEN 1000 MILLIGRAM(S): 325 TABLET ORAL at 06:10

## 2024-08-24 RX ADMIN — ACETAMINOPHEN 1000 MILLIGRAM(S): 325 TABLET ORAL at 13:00

## 2024-08-24 RX ADMIN — ACETAMINOPHEN 400 MILLIGRAM(S): 325 TABLET ORAL at 12:48

## 2024-08-24 RX ADMIN — Medication 5000 UNIT(S): at 13:00

## 2024-08-24 NOTE — PROGRESS NOTE ADULT - SUBJECTIVE AND OBJECTIVE BOX
Anesthesia Pain Management Service    SUBJECTIVE: Patient is doing well with IV PCA but unsure if it is helping.    Pain Scale Score	At rest: ___ 	With Activity: ___ 	[X ] Refer to charted pain scores    THERAPY:    [ ] IV PCA Morphine		[ ] 5 mg/mL	[ ] 1 mg/mL  [X ] IV PCA Hydromorphone	[ ] 5 mg/mL	[X ] 1 mg/mL  [ ] IV PCA Fentanyl		[ ] 50 micrograms/mL    Demand dose __0.2_ lockout __6_ (minutes) Continuous Rate _0__ Total: _5.8__   mg used (in past 24 hrs)      MEDICATIONS  (STANDING):  acetaminophen     Tablet .. 650 milliGRAM(s) Oral every 6 hours  heparin   Injectable 5000 Unit(s) SubCutaneous every 8 hours  insulin lispro (ADMELOG) corrective regimen sliding scale   SubCutaneous at bedtime  insulin lispro (ADMELOG) corrective regimen sliding scale   SubCutaneous three times a day before meals  lactated ringers. 1000 milliLiter(s) (30 mL/Hr) IV Continuous <Continuous>  polyethylene glycol 3350 17 Gram(s) Oral daily  senna 2 Tablet(s) Oral at bedtime    MEDICATIONS  (PRN):  naloxone Injectable 0.1 milliGRAM(s) IV Push every 3 minutes PRN For ANY of the following changes in patient status:  A. RR LESS THAN 10 breaths per minute, B. Oxygen saturation LESS THAN 90%, C. Sedation score of 6  ondansetron Injectable 4 milliGRAM(s) IV Push every 6 hours PRN Nausea  oxyCODONE    IR 5 milliGRAM(s) Oral every 3 hours PRN Moderate Pain (4 - 6)  oxyCODONE    IR 10 milliGRAM(s) Oral every 3 hours PRN Severe Pain (7 - 10)      OBJECTIVE: Patient sitting in bed, CTx1    Sedation Score:	[ X] Alert	[ ] Drowsy 	[ ] Arousable	[ ] Asleep	[ ] Unresponsive    Side Effects:	[X ] None	[ ] Nausea	[ ] Vomiting	[ ] Pruritus  		[ ] Other:    Vital Signs Last 24 Hrs  T(C): 37.1 (24 Aug 2024 12:46), Max: 37.4 (24 Aug 2024 04:55)  T(F): 98.8 (24 Aug 2024 12:46), Max: 99.4 (24 Aug 2024 04:55)  HR: 72 (24 Aug 2024 12:46) (56 - 82)  BP: 100/59 (24 Aug 2024 12:46) (96/55 - 108/77)  BP(mean): 79 (23 Aug 2024 15:00) (77 - 79)  RR: 18 (24 Aug 2024 13:04) (8 - 18)  SpO2: 98% (24 Aug 2024 13:04) (90% - 100%)    Parameters below as of 24 Aug 2024 13:04  Patient On (Oxygen Delivery Method): nasal cannula  O2 Flow (L/min): 2      ASSESSMENT/ PLAN    Therapy to  be:	[ ] Continue   [ X] Discontinued   [X ] Change to prn Analgesics    Documentation and Verification of current medications:   [X] Done	[ ] Not done, not elligible    Comments: Discussed with primary team, PCA discontinued. PRN Oral/IV opioids and/or Adjuvant non-opioid medication to be ordered at this point.    Progress Note written now but Patient was seen earlier.

## 2024-08-24 NOTE — PROGRESS NOTE ADULT - SUBJECTIVE AND OBJECTIVE BOX
Patient seen and examined at bedside by Thoracic.  Resting comfortably in bed on 2L via NC, in NAD.  Patient states she is having a tough time taking deep breaths d/t pain.  Denies other acute complaints at this time.  CT removed at bedside today w/out complication.    Vital Signs:  Vital Signs Last 24 Hrs  T(C): 37.1 (08-24-24 @ 12:46), Max: 37.4 (08-24-24 @ 04:55)  T(F): 98.8 (08-24-24 @ 12:46), Max: 99.4 (08-24-24 @ 04:55)  HR: 72 (08-24-24 @ 12:46) (56 - 82)  BP: 100/59 (08-24-24 @ 12:46) (96/55 - 108/77)  RR: 18 (08-24-24 @ 13:04) (16 - 18)  SpO2: 98% (08-24-24 @ 13:04) (90% - 100%)     General: Appears stated age, NAD  Eyes: Vision grossly intact, EOMI  ENMT: Hearing grossly intact. Airway grossly patent, no stridor  Neck: Neck supple, trachea midline  Cardiovascular: RRR, S1S2, well perfused  Respiratory: Breathing comfortably 2L NC, no respiratory distress, no accessory muscle use.  Gastrointestinal: Soft, NT, ND  Extremities: GRACE x4  Vascular: Well perfused  Neurological: Nonfocal, no deficits  Psychiatric: Appropriate affect  Incisions: RVATS site c/d/i  Tubes: CT d/c'd at bedside today      Relevant labs, radiology and Medications reviewed                        9.5    6.09  )-----------( 170      ( 24 Aug 2024 06:03 )             30.5     08-24    132<L>  |  98  |  17  ----------------------------<  102<H>  4.2   |  23  |  0.73    Ca    8.8      24 Aug 2024 06:03  Phos  3.2     08-24  Mg     1.80     08-24        MEDICATIONS  (STANDING):  acetaminophen     Tablet .. 650 milliGRAM(s) Oral every 6 hours  dornase elliot Solution 2.5 milliGRAM(s) Inhalation daily  heparin   Injectable 5000 Unit(s) SubCutaneous every 8 hours  insulin lispro (ADMELOG) corrective regimen sliding scale   SubCutaneous at bedtime  insulin lispro (ADMELOG) corrective regimen sliding scale   SubCutaneous three times a day before meals  lactated ringers. 1000 milliLiter(s) (30 mL/Hr) IV Continuous <Continuous>  lidocaine   4% Patch 1 Patch Transdermal every 24 hours  polyethylene glycol 3350 17 Gram(s) Oral daily  senna 2 Tablet(s) Oral at bedtime  sodium chloride 3%  Inhalation 4 milliLiter(s) Inhalation every 6 hours    MEDICATIONS  (PRN):  naloxone Injectable 0.1 milliGRAM(s) IV Push every 3 minutes PRN For ANY of the following changes in patient status:  A. RR LESS THAN 10 breaths per minute, B. Oxygen saturation LESS THAN 90%, C. Sedation score of 6  ondansetron Injectable 4 milliGRAM(s) IV Push every 6 hours PRN Nausea  oxyCODONE    IR 5 milliGRAM(s) Oral every 3 hours PRN Moderate Pain (4 - 6)  oxyCODONE    IR 10 milliGRAM(s) Oral every 3 hours PRN Severe Pain (7 - 10)    Pertinent Physical Exam  I&O's Summary    23 Aug 2024 07:01  -  24 Aug 2024 07:00  --------------------------------------------------------  IN: 1030 mL / OUT: 860 mL / NET: 170 mL        Assessment  54y Female w DMII is now s/p RVATS, RUL Wedge, RLL wedge, MLND on 8/23/24.  8/24: CT removed at bedside. Needs pulm toileting.    PLAN  Neuro: Pain management  Pulm: Encourage coughing, deep breathing and use of incentive spirometry. Wean off supplemental oxygen as able. Daily CXR.   Cardio: Monitor telemetry/alarms  GI: Tolerating diet. Continue stool softeners.  Renal: monitor urine output, supplement electrolytes as needed  Vasc: Heparin SC/SCDs for DVT prophylaxis  Heme: Stable H/H. .   ID: Off antibiotics. Stable.  Therapy: OOB/ambulate. Pulm toileting/Chest PT/Nebs  Tubes: CT out today.  Disposition: Aim to D/C to home once O2 sats improved.  Discussed with Cardiothoracic Team at AM rounds.

## 2024-08-25 LAB
ANION GAP SERPL CALC-SCNC: 12 MMOL/L — SIGNIFICANT CHANGE UP (ref 7–14)
BUN SERPL-MCNC: 12 MG/DL — SIGNIFICANT CHANGE UP (ref 7–23)
CALCIUM SERPL-MCNC: 8.9 MG/DL — SIGNIFICANT CHANGE UP (ref 8.4–10.5)
CHLORIDE SERPL-SCNC: 101 MMOL/L — SIGNIFICANT CHANGE UP (ref 98–107)
CO2 SERPL-SCNC: 24 MMOL/L — SIGNIFICANT CHANGE UP (ref 22–31)
CREAT SERPL-MCNC: 0.73 MG/DL — SIGNIFICANT CHANGE UP (ref 0.5–1.3)
EGFR: 99 ML/MIN/1.73M2 — SIGNIFICANT CHANGE UP
GLUCOSE BLDC GLUCOMTR-MCNC: 110 MG/DL — HIGH (ref 70–99)
GLUCOSE BLDC GLUCOMTR-MCNC: 123 MG/DL — HIGH (ref 70–99)
GLUCOSE BLDC GLUCOMTR-MCNC: 131 MG/DL — HIGH (ref 70–99)
GLUCOSE BLDC GLUCOMTR-MCNC: 246 MG/DL — HIGH (ref 70–99)
GLUCOSE SERPL-MCNC: 101 MG/DL — HIGH (ref 70–99)
HCT VFR BLD CALC: 33.3 % — LOW (ref 34.5–45)
HGB BLD-MCNC: 10.7 G/DL — LOW (ref 11.5–15.5)
MAGNESIUM SERPL-MCNC: 1.8 MG/DL — SIGNIFICANT CHANGE UP (ref 1.6–2.6)
MCHC RBC-ENTMCNC: 22.1 PG — LOW (ref 27–34)
MCHC RBC-ENTMCNC: 32.1 GM/DL — SIGNIFICANT CHANGE UP (ref 32–36)
MCV RBC AUTO: 68.7 FL — LOW (ref 80–100)
NRBC # BLD: 0 /100 WBCS — SIGNIFICANT CHANGE UP (ref 0–0)
NRBC # FLD: 0 K/UL — SIGNIFICANT CHANGE UP (ref 0–0)
PHOSPHATE SERPL-MCNC: 2.2 MG/DL — LOW (ref 2.5–4.5)
PLATELET # BLD AUTO: 183 K/UL — SIGNIFICANT CHANGE UP (ref 150–400)
POTASSIUM SERPL-MCNC: 4.3 MMOL/L — SIGNIFICANT CHANGE UP (ref 3.5–5.3)
POTASSIUM SERPL-SCNC: 4.3 MMOL/L — SIGNIFICANT CHANGE UP (ref 3.5–5.3)
RBC # BLD: 4.85 M/UL — SIGNIFICANT CHANGE UP (ref 3.8–5.2)
RBC # FLD: 15.3 % — HIGH (ref 10.3–14.5)
SODIUM SERPL-SCNC: 137 MMOL/L — SIGNIFICANT CHANGE UP (ref 135–145)
WBC # BLD: 6.86 K/UL — SIGNIFICANT CHANGE UP (ref 3.8–10.5)
WBC # FLD AUTO: 6.86 K/UL — SIGNIFICANT CHANGE UP (ref 3.8–10.5)

## 2024-08-25 PROCEDURE — 71045 X-RAY EXAM CHEST 1 VIEW: CPT | Mod: 26

## 2024-08-25 RX ORDER — FUROSEMIDE 40 MG
10 TABLET ORAL ONCE
Refills: 0 | Status: COMPLETED | OUTPATIENT
Start: 2024-08-25 | End: 2024-08-25

## 2024-08-25 RX ORDER — ACETAMINOPHEN 325 MG/1
1000 TABLET ORAL EVERY 6 HOURS
Refills: 0 | Status: DISCONTINUED | OUTPATIENT
Start: 2024-08-25 | End: 2024-08-25

## 2024-08-25 RX ORDER — ACETAMINOPHEN 325 MG/1
650 TABLET ORAL EVERY 6 HOURS
Refills: 0 | Status: DISCONTINUED | OUTPATIENT
Start: 2024-08-25 | End: 2024-08-28

## 2024-08-25 RX ADMIN — Medication 2 TABLET(S): at 21:18

## 2024-08-25 RX ADMIN — Medication 10 MILLIGRAM(S): at 16:33

## 2024-08-25 RX ADMIN — ACETAMINOPHEN 1000 MILLIGRAM(S): 325 TABLET ORAL at 13:00

## 2024-08-25 RX ADMIN — Medication 2.5 MILLIGRAM(S): at 11:23

## 2024-08-25 RX ADMIN — Medication 2.5 MILLIGRAM(S): at 16:08

## 2024-08-25 RX ADMIN — SODIUM CHLORIDE 4 MILLILITER(S): 9 INJECTION INTRAMUSCULAR; INTRAVENOUS; SUBCUTANEOUS at 16:09

## 2024-08-25 RX ADMIN — OXYCODONE HYDROCHLORIDE 10 MILLIGRAM(S): 5 TABLET ORAL at 19:40

## 2024-08-25 RX ADMIN — Medication 1 PATCH: at 19:29

## 2024-08-25 RX ADMIN — Medication 1 PATCH: at 12:46

## 2024-08-25 RX ADMIN — OXYCODONE HYDROCHLORIDE 10 MILLIGRAM(S): 5 TABLET ORAL at 08:04

## 2024-08-25 RX ADMIN — Medication 5000 UNIT(S): at 21:19

## 2024-08-25 RX ADMIN — Medication 5000 UNIT(S): at 15:36

## 2024-08-25 RX ADMIN — ACETAMINOPHEN 650 MILLIGRAM(S): 325 TABLET ORAL at 00:42

## 2024-08-25 RX ADMIN — Medication 2.5 MILLIGRAM(S): at 21:21

## 2024-08-25 RX ADMIN — DORNASE ALFA 2.5 MILLIGRAM(S): 1 SOLUTION RESPIRATORY (INHALATION) at 11:25

## 2024-08-25 RX ADMIN — ACETAMINOPHEN 400 MILLIGRAM(S): 325 TABLET ORAL at 12:46

## 2024-08-25 RX ADMIN — SODIUM CHLORIDE 4 MILLILITER(S): 9 INJECTION INTRAMUSCULAR; INTRAVENOUS; SUBCUTANEOUS at 11:25

## 2024-08-25 RX ADMIN — OXYCODONE HYDROCHLORIDE 10 MILLIGRAM(S): 5 TABLET ORAL at 09:00

## 2024-08-25 RX ADMIN — OXYCODONE HYDROCHLORIDE 10 MILLIGRAM(S): 5 TABLET ORAL at 01:15

## 2024-08-25 RX ADMIN — ACETAMINOPHEN 650 MILLIGRAM(S): 325 TABLET ORAL at 20:55

## 2024-08-25 RX ADMIN — ACETAMINOPHEN 650 MILLIGRAM(S): 325 TABLET ORAL at 01:15

## 2024-08-25 RX ADMIN — OXYCODONE HYDROCHLORIDE 10 MILLIGRAM(S): 5 TABLET ORAL at 20:30

## 2024-08-25 RX ADMIN — Medication 5000 UNIT(S): at 05:19

## 2024-08-25 RX ADMIN — OXYCODONE HYDROCHLORIDE 10 MILLIGRAM(S): 5 TABLET ORAL at 00:45

## 2024-08-25 RX ADMIN — SODIUM CHLORIDE 4 MILLILITER(S): 9 INJECTION INTRAMUSCULAR; INTRAVENOUS; SUBCUTANEOUS at 21:21

## 2024-08-25 RX ADMIN — ACETAMINOPHEN 650 MILLIGRAM(S): 325 TABLET ORAL at 19:57

## 2024-08-25 NOTE — PATIENT PROFILE ADULT - FUNCTIONAL ASSESSMENT - BASIC MOBILITY 3.
Adria Nogueira is a 54 y.o. female.     Chief Complaint   Patient presents with   • Back Pain     mri follow up i worse mask and face shield    • Cyst     ptb has a few knoy in hand for long time would like to check they can be very painful and get bigger        HPI     Pt is a pleasant 54 y.o. YO female here for follow-up right-sided lower back pain with radiculopathy.  Discussed MRI results with patient showing mild to moderate facet arthritis with osteophyte formation and spinal stenosis with mild thecal sac narrowing and cord compression.  As her symptoms are mild and seem to wax and wane I recommended that she continue physical therapy and consider epidural injections.  We discussed weight loss at length.  She eats a very high carb diet with as a vegetarian and eats bread and rice with beans every day with the  diet.    She has a couple lesions on her fingers that are prominent and wax and wane, seem to be worse after using her hands more.  Another area of swelling around her nailbed.  There is some redness, pain and tenderness but seems to be improving.    The following portions of the patient's history were reviewed and updated as appropriate: allergies, current medications, past family history, past medical history, past social history, past surgical history and problem list.    Review of Systems   Constitutional: Negative.    Eyes: Negative.    Respiratory: Negative.    Gastrointestinal: Negative.    Endocrine: Negative.    Genitourinary: Negative.    Musculoskeletal: Positive for back pain and myalgias.   Skin:        knin knots in right hand    Allergic/Immunologic: Negative.    Neurological: Negative.    Hematological: Negative.    Psychiatric/Behavioral: Negative.        Objective  Vitals:    09/09/20 1514   BP: 102/72   Pulse: 93   Temp: 98 °F (36.7 °C)   SpO2: 98%        Physical Exam   Constitutional: She is oriented to person, place, and time. She appears well-developed and  well-nourished. No distress.   HENT:   Head: Normocephalic.   Nose: Nose normal.   Eyes: EOM are normal.   Cardiovascular: Normal rate, regular rhythm, normal heart sounds and intact distal pulses.   No murmur heard.  Pulmonary/Chest: Effort normal and breath sounds normal. No respiratory distress.   Musculoskeletal: Normal range of motion.   Neurological: She is alert and oriented to person, place, and time.   Skin: Skin is warm and dry. No rash noted.   Callus formation on the thumb and middle finger, mild paronychia at the nail primary to the left ring finger.  Mild   Psychiatric: She has a normal mood and affect. Her behavior is normal. Judgment and thought content normal.   Nursing note and vitals reviewed.      No current outpatient medications on file.    Procedures    Lab Results (most recent)     Velvet Covington was seen today for back pain and cyst.    Diagnoses and all orders for this visit:    Spinal stenosis of lumbar region with neurogenic claudication    Morbid (severe) obesity due to excess calories (CMS/HCC)    Paronychia, finger, left      Spinal stenosis not well controlled, discussed MRI results with patient.  She would like to think about the next options before proceeding forward.  Recommended continuing the physical therapy and starting injections with pain management.  Advised weight loss, in general trying to decrease carbohydrates daily.  I recommended that she consider a consultation with a dietitian.  Initially she was resistant but no for this time of our conversation she was more open to making some lifestyle changes.  We will follow-up at next appointment.    She has some calluses on her hand, recommended soaking with Epson salts and using a file to reduce the prominence.  Also with a small paronychia on the right index finger, recommended at soaking with Epson salts as her symptoms are mild with milking the inflamed area towards the nailbed, follow-up if not  improving.    Return in about 3 months (around 12/9/2020), or if symptoms worsen or fail to improve, for Recheck back pain and weight loss.      Aura Graham MD    Mask worn during patient encounter.   4 = No assist / stand by assistance

## 2024-08-25 NOTE — PROVIDER CONTACT NOTE (OTHER) - ASSESSMENT
Pt. A&)x4, no c/o chest pain. Pt. complaining of 8/10 pain before and was medicated for that. Pt. complains of feeling cold.

## 2024-08-25 NOTE — PATIENT PROFILE ADULT - FALL HARM RISK - DEVICES
Regards to being in er on Tuesday this about a clot in his lung.   Requesting to speak to nurse    
Returned call to Nichole, she was wanting to schedule follow up. [facility], Nichole transferred back to scheduling.       Regards to being in er on Tuesday this about a clot in his lung.   Requesting to speak to nurse    
None

## 2024-08-25 NOTE — PROVIDER CONTACT NOTE (OTHER) - ACTION/TREATMENT ORDERED:
Provider made aware of temp. Provider going to reorder oral tylenol for fever. Provider did not want any labs to be done. Will continue plan of care as ordered

## 2024-08-25 NOTE — PHYSICAL THERAPY INITIAL EVALUATION ADULT - PERTINENT HX OF CURRENT PROBLEM, REHAB EVAL
54 yr old female with  medical hx  T2DM no meds presents for preop evaluation with dx of Localized Enlarged Lymph Nodes.  Patient reported that on her work-up for colonoscopy she went for cxr which resulted abnormal and she was then sent for CT scan.    CT chest on 3/25/2024:  - left lower lobe 4.6x2.6cm wedge shaped opacity with a few air bronchograms may represent pneumonia or possibly pulmonary infract but is nonspecific.  - milder right lower lobe suspected atelectasis or scarring.

## 2024-08-25 NOTE — PATIENT PROFILE ADULT - FALL HARM RISK - HARM RISK INTERVENTIONS
Assistance with ambulation/Assistance OOB with selected safe patient handling equipment/Communicate Risk of Fall with Harm to all staff/Monitor gait and stability/Reinforce activity limits and safety measures with patient and family/Review medications for side effects contributing to fall risk/Sit up slowly, dangle for a short time, stand at bedside before walking/Tailored Fall Risk Interventions/Toileting schedule using arm’s reach rule for commode and bathroom/Use of alarms - bed, chair and/or voice tab/Visual Cue: Yellow wristband and red socks/Bed in lowest position, wheels locked, appropriate side rails in place/Call bell, personal items and telephone in reach/Instruct patient to call for assistance before getting out of bed or chair/Non-slip footwear when patient is out of bed/[place] to call system/Physically safe environment - no spills, clutter or unnecessary equipment/Purposeful Proactive Rounding/Room/bathroom lighting operational, light cord in reach

## 2024-08-25 NOTE — PROGRESS NOTE ADULT - SUBJECTIVE AND OBJECTIVE BOX
Subjective & objective:  Patient seen and examined at bedside by Thoracic surgery.  Resting comfortably in bed on 2L via NC, NAD.  Patient states she is having a tough time taking deep breaths due to pain.  Patient able to use incentive spirometer 500 ml.   Denies other acute complaints at this time.  CT removed at bedside yesterday on 08/24    Vital Signs:  Vital Signs Last 24 Hrs  T(C): 37.1 (08-25-24 @ 13:17), Max: 37.4 (08-24-24 @ 19:59)  T(F): 98.8 (08-25-24 @ 13:17), Max: 99.4 (08-24-24 @ 19:59)  HR: 85 (08-25-24 @ 13:17) (65 - 85)  BP: 126/58 (08-25-24 @ 13:17) (101/61 - 126/58)  RR: 18 (08-25-24 @ 13:17) (18 - 18)  SpO2: 100% (08-25-24 @ 13:17) (92% - 100%) on (O2)    PE:  General: Appears stated age, NAD  Eyes: Vision grossly intact, EOMI  ENMT: Hearing grossly intact. Airway grossly patent, no stridor  Neck: Neck supple, trachea midline  Cardiovascular: RRR, S1S2, well perfused  Respiratory: Breathing comfortably 2L NC, no respiratory distress, no accessory muscle use.  Gastrointestinal: Soft, NT, ND  Extremities: GRACE x4  Vascular: Well perfused  Neurological: Nonfocal, no deficits  Psychiatric: Appropriate affect  Incisions: RVATS site c/d/i    Relevant labs, radiology and Medications reviewed                        10.7   6.86  )-----------( 183      ( 25 Aug 2024 05:56 )             33.3     08-25    137  |  101  |  12  ----------------------------<  101<H>  4.3   |  24  |  0.73    Ca    8.9      25 Aug 2024 05:56  Phos  2.2     08-25  Mg     1.80     08-25    MEDICATIONS  (STANDING):  albuterol    0.083% 2.5 milliGRAM(s) Nebulizer every 6 hours  dornase elliot Solution 2.5 milliGRAM(s) Inhalation daily  heparin   Injectable 5000 Unit(s) SubCutaneous every 8 hours  insulin lispro (ADMELOG) corrective regimen sliding scale   SubCutaneous at bedtime  insulin lispro (ADMELOG) corrective regimen sliding scale   SubCutaneous three times a day before meals  lidocaine   4% Patch 1 Patch Transdermal every 24 hours  polyethylene glycol 3350 17 Gram(s) Oral daily  senna 2 Tablet(s) Oral at bedtime  sodium chloride 3%  Inhalation 4 milliLiter(s) Inhalation every 6 hours    MEDICATIONS  (PRN):  acetaminophen   IVPB .. 1000 milliGRAM(s) IV Intermittent every 6 hours PRN Mild Pain (1 - 3), Moderate Pain (4 - 6)  naloxone Injectable 0.1 milliGRAM(s) IV Push every 3 minutes PRN For ANY of the following changes in patient status:  A. RR LESS THAN 10 breaths per minute, B. Oxygen saturation LESS THAN 90%, C. Sedation score of 6  ondansetron Injectable 4 milliGRAM(s) IV Push every 6 hours PRN Nausea  oxyCODONE    IR 5 milliGRAM(s) Oral every 3 hours PRN Moderate Pain (4 - 6)  oxyCODONE    IR 10 milliGRAM(s) Oral every 3 hours PRN Severe Pain (7 - 10)    Pertinent Physical Exam  I&O's Summary    24 Aug 2024 07:01  -  25 Aug 2024 07:00  --------------------------------------------------------  IN: 1140 mL / OUT: 1820 mL / NET: -680 mL    25 Aug 2024 07:01  -  25 Aug 2024 14:13  --------------------------------------------------------  IN: 240 mL / OUT: 300 mL / NET: -60 mL    Assessment  54y Female w DMII is now s/p RVATS, RUL Wedge, RLL wedge, MLND on 8/23/24.  8/24: CT removed at bedside. Needs pulm toileting.  8/25 CXR worse today, still needs pulmonary toileting with nebulizer treatment and chest PT. On NC 2 lpm.     PLAN  Neuro: Pain management as needed  Pulm: Encourage coughing, deep breathing and use of incentive spirometry. Wean off supplemental oxygen as able. Daily CXR.   Cardio: Monitor telemetry/alarms  GI: Tolerating diet. Continue stool softeners.  Renal: monitor urine output, supplement electrolytes as needed  Vasc: Heparin SC/SCDs for DVT prophylaxis  Heme: Stable H/H. .   ID: Off antibiotics. Stable.  Therapy: OOB/ambulate. Pulm toileting/Chest PT/Nebs  Tubes: CT out today.  Disposition: Aim to D/C to home once O2 sats improved.  Discussed with Cardiothoracic Team at AM rounds.  Plan above as per Dr. Mansfield   Subjective & objective:  Patient seen and examined at bedside by Thoracic surgery.  Resting comfortably in bed on 2L via NC, NAD.  Patient states she is having a tough time taking deep breaths due to pain.  Patient able to use incentive spirometer 500 ml.   Denies other acute complaints at this time.  CT removed at bedside yesterday on 08/24    Vital Signs:  Vital Signs Last 24 Hrs  T(C): 37.1 (08-25-24 @ 13:17), Max: 37.4 (08-24-24 @ 19:59)  T(F): 98.8 (08-25-24 @ 13:17), Max: 99.4 (08-24-24 @ 19:59)  HR: 85 (08-25-24 @ 13:17) (65 - 85)  BP: 126/58 (08-25-24 @ 13:17) (101/61 - 126/58)  RR: 18 (08-25-24 @ 13:17) (18 - 18)  SpO2: 100% (08-25-24 @ 13:17) (92% - 100%) on (O2)    PE:  General: Appears stated age, NAD  Eyes: Vision grossly intact, EOMI  ENMT: Hearing grossly intact. Airway grossly patent, no stridor  Neck: Neck supple, trachea midline  Cardiovascular: RRR, S1S2, well perfused  Respiratory: Breathing comfortably 2L NC, no respiratory distress, no accessory muscle use.  Gastrointestinal: Soft, NT, ND  Extremities: GRACE x4  Vascular: Well perfused  Neurological: Nonfocal, no deficits  Psychiatric: Appropriate affect  Incisions: RVATS site c/d/i    Relevant labs, radiology and Medications reviewed                        10.7   6.86  )-----------( 183      ( 25 Aug 2024 05:56 )             33.3     08-25    137  |  101  |  12  ----------------------------<  101<H>  4.3   |  24  |  0.73    Ca    8.9      25 Aug 2024 05:56  Phos  2.2     08-25  Mg     1.80     08-25    MEDICATIONS  (STANDING):  albuterol    0.083% 2.5 milliGRAM(s) Nebulizer every 6 hours  dornase elliot Solution 2.5 milliGRAM(s) Inhalation daily  heparin   Injectable 5000 Unit(s) SubCutaneous every 8 hours  insulin lispro (ADMELOG) corrective regimen sliding scale   SubCutaneous at bedtime  insulin lispro (ADMELOG) corrective regimen sliding scale   SubCutaneous three times a day before meals  lidocaine   4% Patch 1 Patch Transdermal every 24 hours  polyethylene glycol 3350 17 Gram(s) Oral daily  senna 2 Tablet(s) Oral at bedtime  sodium chloride 3%  Inhalation 4 milliLiter(s) Inhalation every 6 hours    MEDICATIONS  (PRN):  acetaminophen   IVPB .. 1000 milliGRAM(s) IV Intermittent every 6 hours PRN Mild Pain (1 - 3), Moderate Pain (4 - 6)  naloxone Injectable 0.1 milliGRAM(s) IV Push every 3 minutes PRN For ANY of the following changes in patient status:  A. RR LESS THAN 10 breaths per minute, B. Oxygen saturation LESS THAN 90%, C. Sedation score of 6  ondansetron Injectable 4 milliGRAM(s) IV Push every 6 hours PRN Nausea  oxyCODONE    IR 5 milliGRAM(s) Oral every 3 hours PRN Moderate Pain (4 - 6)  oxyCODONE    IR 10 milliGRAM(s) Oral every 3 hours PRN Severe Pain (7 - 10)    Pertinent Physical Exam  I&O's Summary    24 Aug 2024 07:01  -  25 Aug 2024 07:00  --------------------------------------------------------  IN: 1140 mL / OUT: 1820 mL / NET: -680 mL    25 Aug 2024 07:01  -  25 Aug 2024 14:13  --------------------------------------------------------  IN: 240 mL / OUT: 300 mL / NET: -60 mL    Assessment  54y Female w DMII is now s/p RVATS, RUL Wedge, RLL wedge, MLND on 8/23/24.  8/24: CT removed at bedside. Needs pulm toileting.  8/25 CXR worse today, still needs pulmonary toileting with nebulizer treatment and chest PT. On NC 2 lpm.     PLAN  Neuro: Pain management as needed  Pulm: Encourage coughing, deep breathing and use of incentive spirometry. Wean off supplemental oxygen as able. Daily CXR. Pulmonary toileting with nebulizer treatments and chest PT.   Cardio: Monitor telemetry/alarms  GI: Tolerating diet. Continue stool softeners.  Renal: monitor urine output, supplement electrolytes as needed  Vasc: Heparin SC/SCDs for DVT prophylaxis  Heme: Stable H/H. .   ID: Off antibiotics. Stable.  Therapy: OOB/ambulate. Pulm toileting/Chest PT/Nebs  Tubes: CT out today.  Disposition: Aim to D/C to home once O2 sats improved.  Discussed with Cardiothoracic Team at AM rounds.  Plan above as per Dr. Mansfield

## 2024-08-26 LAB
ANION GAP SERPL CALC-SCNC: 14 MMOL/L — SIGNIFICANT CHANGE UP (ref 7–14)
BUN SERPL-MCNC: 12 MG/DL — SIGNIFICANT CHANGE UP (ref 7–23)
CALCIUM SERPL-MCNC: 8.9 MG/DL — SIGNIFICANT CHANGE UP (ref 8.4–10.5)
CHLORIDE SERPL-SCNC: 100 MMOL/L — SIGNIFICANT CHANGE UP (ref 98–107)
CO2 SERPL-SCNC: 23 MMOL/L — SIGNIFICANT CHANGE UP (ref 22–31)
CREAT SERPL-MCNC: 0.58 MG/DL — SIGNIFICANT CHANGE UP (ref 0.5–1.3)
EGFR: 109 ML/MIN/1.73M2 — SIGNIFICANT CHANGE UP
GLUCOSE BLDC GLUCOMTR-MCNC: 112 MG/DL — HIGH (ref 70–99)
GLUCOSE BLDC GLUCOMTR-MCNC: 131 MG/DL — HIGH (ref 70–99)
GLUCOSE BLDC GLUCOMTR-MCNC: 195 MG/DL — HIGH (ref 70–99)
GLUCOSE BLDC GLUCOMTR-MCNC: 85 MG/DL — SIGNIFICANT CHANGE UP (ref 70–99)
GLUCOSE SERPL-MCNC: 126 MG/DL — HIGH (ref 70–99)
HCT VFR BLD CALC: 31.8 % — LOW (ref 34.5–45)
HGB BLD-MCNC: 10.2 G/DL — LOW (ref 11.5–15.5)
MAGNESIUM SERPL-MCNC: 1.8 MG/DL — SIGNIFICANT CHANGE UP (ref 1.6–2.6)
MCHC RBC-ENTMCNC: 22.2 PG — LOW (ref 27–34)
MCHC RBC-ENTMCNC: 32.1 GM/DL — SIGNIFICANT CHANGE UP (ref 32–36)
MCV RBC AUTO: 69.3 FL — LOW (ref 80–100)
NRBC # BLD: 0 /100 WBCS — SIGNIFICANT CHANGE UP (ref 0–0)
NRBC # FLD: 0 K/UL — SIGNIFICANT CHANGE UP (ref 0–0)
PHOSPHATE SERPL-MCNC: 3 MG/DL — SIGNIFICANT CHANGE UP (ref 2.5–4.5)
PLATELET # BLD AUTO: 179 K/UL — SIGNIFICANT CHANGE UP (ref 150–400)
POTASSIUM SERPL-MCNC: 3.7 MMOL/L — SIGNIFICANT CHANGE UP (ref 3.5–5.3)
POTASSIUM SERPL-SCNC: 3.7 MMOL/L — SIGNIFICANT CHANGE UP (ref 3.5–5.3)
RBC # BLD: 4.59 M/UL — SIGNIFICANT CHANGE UP (ref 3.8–5.2)
RBC # FLD: 15.3 % — HIGH (ref 10.3–14.5)
SODIUM SERPL-SCNC: 137 MMOL/L — SIGNIFICANT CHANGE UP (ref 135–145)
WBC # BLD: 5.57 K/UL — SIGNIFICANT CHANGE UP (ref 3.8–10.5)
WBC # FLD AUTO: 5.57 K/UL — SIGNIFICANT CHANGE UP (ref 3.8–10.5)

## 2024-08-26 PROCEDURE — 71045 X-RAY EXAM CHEST 1 VIEW: CPT | Mod: 26

## 2024-08-26 RX ORDER — POTASSIUM CHLORIDE 10 MEQ
20 TABLET, EXT RELEASE, PARTICLES/CRYSTALS ORAL ONCE
Refills: 0 | Status: COMPLETED | OUTPATIENT
Start: 2024-08-26 | End: 2024-08-26

## 2024-08-26 RX ORDER — FUROSEMIDE 40 MG
20 TABLET ORAL ONCE
Refills: 0 | Status: COMPLETED | OUTPATIENT
Start: 2024-08-26 | End: 2024-08-26

## 2024-08-26 RX ORDER — LIDOCAINE/BENZALKONIUM/ALCOHOL
1 SOLUTION, NON-ORAL TOPICAL EVERY 24 HOURS
Refills: 0 | Status: DISCONTINUED | OUTPATIENT
Start: 2024-08-26 | End: 2024-08-28

## 2024-08-26 RX ADMIN — DORNASE ALFA 2.5 MILLIGRAM(S): 1 SOLUTION RESPIRATORY (INHALATION) at 06:43

## 2024-08-26 RX ADMIN — Medication 2.5 MILLIGRAM(S): at 03:53

## 2024-08-26 RX ADMIN — Medication 20 MILLIGRAM(S): at 10:35

## 2024-08-26 RX ADMIN — OXYCODONE HYDROCHLORIDE 5 MILLIGRAM(S): 5 TABLET ORAL at 14:08

## 2024-08-26 RX ADMIN — Medication 5000 UNIT(S): at 05:29

## 2024-08-26 RX ADMIN — SODIUM CHLORIDE 4 MILLILITER(S): 9 INJECTION INTRAMUSCULAR; INTRAVENOUS; SUBCUTANEOUS at 06:44

## 2024-08-26 RX ADMIN — SODIUM CHLORIDE 4 MILLILITER(S): 9 INJECTION INTRAMUSCULAR; INTRAVENOUS; SUBCUTANEOUS at 03:53

## 2024-08-26 RX ADMIN — SODIUM CHLORIDE 4 MILLILITER(S): 9 INJECTION INTRAMUSCULAR; INTRAVENOUS; SUBCUTANEOUS at 16:15

## 2024-08-26 RX ADMIN — Medication 2.5 MILLIGRAM(S): at 21:45

## 2024-08-26 RX ADMIN — POLYETHYLENE GLYCOL 3350 17 GRAM(S): 17 POWDER, FOR SOLUTION ORAL at 12:11

## 2024-08-26 RX ADMIN — Medication 5000 UNIT(S): at 21:06

## 2024-08-26 RX ADMIN — Medication 1 PATCH: at 21:51

## 2024-08-26 RX ADMIN — Medication 20 MILLIEQUIVALENT(S): at 10:35

## 2024-08-26 RX ADMIN — OXYCODONE HYDROCHLORIDE 10 MILLIGRAM(S): 5 TABLET ORAL at 05:29

## 2024-08-26 RX ADMIN — Medication 1: at 12:11

## 2024-08-26 RX ADMIN — Medication 1 PATCH: at 01:00

## 2024-08-26 RX ADMIN — Medication 2.5 MILLIGRAM(S): at 16:15

## 2024-08-26 RX ADMIN — Medication 5000 UNIT(S): at 13:39

## 2024-08-26 RX ADMIN — OXYCODONE HYDROCHLORIDE 10 MILLIGRAM(S): 5 TABLET ORAL at 06:10

## 2024-08-26 RX ADMIN — Medication 2.5 MILLIGRAM(S): at 06:43

## 2024-08-26 RX ADMIN — Medication 2 TABLET(S): at 21:06

## 2024-08-26 RX ADMIN — OXYCODONE HYDROCHLORIDE 5 MILLIGRAM(S): 5 TABLET ORAL at 13:38

## 2024-08-26 RX ADMIN — SODIUM CHLORIDE 4 MILLILITER(S): 9 INJECTION INTRAMUSCULAR; INTRAVENOUS; SUBCUTANEOUS at 21:45

## 2024-08-26 NOTE — PROGRESS NOTE ADULT - SUBJECTIVE AND OBJECTIVE BOX
Subjective: pt seen and examined several times througout the day   at bedside  chest PT provided  incentive spirometry teaching  ambulated with pt several times in hallway  lasix 20 IVP  oxygenation improving  pt requesting bowel regimen, ordered      Vital Signs:  Vital Signs Last 24 Hrs  T(C): 37.1 (24 @ 04:32), Max: 37.3 (24 @ 00:45)  T(F): 98.7 (24 @ 04:32), Max: 99.1 (24 @ 00:45)  HR: 84 (24 @ 04:32) (70 - 98)  BP: 105/61 (24 @ 04:32) (104/56 - 128/69)  RR: 18 (24 @ 04:32) (18 - 18)  SpO2: 93% (24 @ 04:32) (93% - 99%) on (O2)    Telemetry/Alarms: sr  General: awake and alert NAD  Neurology: A&Ox3, nonfocal, GRACE x 4  Respiratory: CTA B/L  CV: RRR, S1S2, no murmurs, rubs or gallops  Abdominal: Soft, NT, ND +BS, Last BM  Extremities: No edema, + peripheral pulses  Incisions: c,d,i      Relevant labs, radiology and Medications reviewed                        10.5   5.11  )-----------( 200      ( 27 Aug 2024 05:42 )             32.3     08    137  |  100  |  15  ----------------------------<  108<H>  3.8   |  25  |  0.70    Ca    9.4      27 Aug 2024 05:42        MEDICATIONS  (STANDING):  albuterol    0.083% 2.5 milliGRAM(s) Nebulizer every 6 hours  dornase elliot Solution 2.5 milliGRAM(s) Inhalation daily  heparin   Injectable 5000 Unit(s) SubCutaneous every 8 hours  insulin lispro (ADMELOG) corrective regimen sliding scale   SubCutaneous at bedtime  insulin lispro (ADMELOG) corrective regimen sliding scale   SubCutaneous three times a day before meals  lidocaine   4% Patch 1 Patch Transdermal every 24 hours  lidocaine   4% Patch 1 Patch Transdermal every 24 hours  polyethylene glycol 3350 17 Gram(s) Oral daily  senna 2 Tablet(s) Oral at bedtime  sodium chloride 3%  Inhalation 4 milliLiter(s) Inhalation every 6 hours    MEDICATIONS  (PRN):  acetaminophen     Tablet .. 650 milliGRAM(s) Oral every 6 hours PRN Temp greater or equal to 38C (100.4F), Mild Pain (1 - 3), Moderate Pain (4 - 6)  bisacodyl 5 milliGRAM(s) Oral every 12 hours PRN Constipation  naloxone Injectable 0.1 milliGRAM(s) IV Push every 3 minutes PRN For ANY of the following changes in patient status:  A. RR LESS THAN 10 breaths per minute, B. Oxygen saturation LESS THAN 90%, C. Sedation score of 6  ondansetron Injectable 4 milliGRAM(s) IV Push every 6 hours PRN Nausea  oxyCODONE    IR 10 milliGRAM(s) Oral every 3 hours PRN Severe Pain (7 - 10)  oxyCODONE    IR 5 milliGRAM(s) Oral every 3 hours PRN Moderate Pain (4 - 6)    Pertinent Physical Exam  I&O's Summary    27 Aug 2024 07:01  -  28 Aug 2024 07:00  --------------------------------------------------------  IN: 1090 mL / OUT: 550 mL / NET: 540 mL            PAST MEDICAL & SURGICAL HISTORY:  Localized enlarged lymph nodes      History of 3  sections          ASSESSMENT  54y Female w DMII is now s/p RVATS, RUL Wedge, RLL wedge, MLND on 24.  : CT removed at bedside. Needs pulm toileting, chest PT, incentive spirometer and bowel regimen pop    PLAN  Neuro: Pain management  Pulm: Encourage coughing, deep breathing and use of incentive spirometry. Wean off supplemental oxygen as able. Daily CXR.   Cardio: Monitor telemetry/alarms  GI: Tolerating diet. Continue stool softeners.  Renal: monitor urine output, supplement electrolytes as needed  Vasc: Heparin SC/SCDs for DVT prophylaxis  Heme: Stable H/H. .   ID: Off antibiotics. Stable.  Therapy: OOB/ambulate  Disposition: Aim to D/C to home once optimized   Discussed with Cardiothoracic Team at AM rounds.

## 2024-08-27 LAB
ANION GAP SERPL CALC-SCNC: 12 MMOL/L — SIGNIFICANT CHANGE UP (ref 7–14)
BUN SERPL-MCNC: 15 MG/DL — SIGNIFICANT CHANGE UP (ref 7–23)
CALCIUM SERPL-MCNC: 9.4 MG/DL — SIGNIFICANT CHANGE UP (ref 8.4–10.5)
CHLORIDE SERPL-SCNC: 100 MMOL/L — SIGNIFICANT CHANGE UP (ref 98–107)
CO2 SERPL-SCNC: 25 MMOL/L — SIGNIFICANT CHANGE UP (ref 22–31)
CREAT SERPL-MCNC: 0.7 MG/DL — SIGNIFICANT CHANGE UP (ref 0.5–1.3)
EGFR: 104 ML/MIN/1.73M2 — SIGNIFICANT CHANGE UP
GLUCOSE BLDC GLUCOMTR-MCNC: 104 MG/DL — HIGH (ref 70–99)
GLUCOSE BLDC GLUCOMTR-MCNC: 111 MG/DL — HIGH (ref 70–99)
GLUCOSE BLDC GLUCOMTR-MCNC: 121 MG/DL — HIGH (ref 70–99)
GLUCOSE BLDC GLUCOMTR-MCNC: 265 MG/DL — HIGH (ref 70–99)
GLUCOSE BLDC GLUCOMTR-MCNC: 93 MG/DL — SIGNIFICANT CHANGE UP (ref 70–99)
GLUCOSE SERPL-MCNC: 108 MG/DL — HIGH (ref 70–99)
HCT VFR BLD CALC: 32.3 % — LOW (ref 34.5–45)
HGB BLD-MCNC: 10.5 G/DL — LOW (ref 11.5–15.5)
MCHC RBC-ENTMCNC: 22.3 PG — LOW (ref 27–34)
MCHC RBC-ENTMCNC: 32.5 GM/DL — SIGNIFICANT CHANGE UP (ref 32–36)
MCV RBC AUTO: 68.6 FL — LOW (ref 80–100)
NRBC # BLD: 0 /100 WBCS — SIGNIFICANT CHANGE UP (ref 0–0)
NRBC # FLD: 0 K/UL — SIGNIFICANT CHANGE UP (ref 0–0)
PLATELET # BLD AUTO: 200 K/UL — SIGNIFICANT CHANGE UP (ref 150–400)
POTASSIUM SERPL-MCNC: 3.8 MMOL/L — SIGNIFICANT CHANGE UP (ref 3.5–5.3)
POTASSIUM SERPL-SCNC: 3.8 MMOL/L — SIGNIFICANT CHANGE UP (ref 3.5–5.3)
RBC # BLD: 4.71 M/UL — SIGNIFICANT CHANGE UP (ref 3.8–5.2)
RBC # FLD: 15.1 % — HIGH (ref 10.3–14.5)
SODIUM SERPL-SCNC: 137 MMOL/L — SIGNIFICANT CHANGE UP (ref 135–145)
WBC # BLD: 5.11 K/UL — SIGNIFICANT CHANGE UP (ref 3.8–10.5)
WBC # FLD AUTO: 5.11 K/UL — SIGNIFICANT CHANGE UP (ref 3.8–10.5)

## 2024-08-27 PROCEDURE — 71045 X-RAY EXAM CHEST 1 VIEW: CPT | Mod: 26

## 2024-08-27 RX ADMIN — Medication 1 PATCH: at 19:53

## 2024-08-27 RX ADMIN — Medication 2.5 MILLIGRAM(S): at 21:07

## 2024-08-27 RX ADMIN — DORNASE ALFA 2.5 MILLIGRAM(S): 1 SOLUTION RESPIRATORY (INHALATION) at 10:29

## 2024-08-27 RX ADMIN — Medication 2 TABLET(S): at 21:10

## 2024-08-27 RX ADMIN — POLYETHYLENE GLYCOL 3350 17 GRAM(S): 17 POWDER, FOR SOLUTION ORAL at 11:13

## 2024-08-27 RX ADMIN — SODIUM CHLORIDE 4 MILLILITER(S): 9 INJECTION INTRAMUSCULAR; INTRAVENOUS; SUBCUTANEOUS at 04:13

## 2024-08-27 RX ADMIN — Medication 1 PATCH: at 23:14

## 2024-08-27 RX ADMIN — Medication 2.5 MILLIGRAM(S): at 04:13

## 2024-08-27 RX ADMIN — Medication 5000 UNIT(S): at 21:09

## 2024-08-27 RX ADMIN — OXYCODONE HYDROCHLORIDE 10 MILLIGRAM(S): 5 TABLET ORAL at 20:50

## 2024-08-27 RX ADMIN — Medication 2.5 MILLIGRAM(S): at 10:26

## 2024-08-27 RX ADMIN — SODIUM CHLORIDE 4 MILLILITER(S): 9 INJECTION INTRAMUSCULAR; INTRAVENOUS; SUBCUTANEOUS at 10:26

## 2024-08-27 RX ADMIN — OXYCODONE HYDROCHLORIDE 5 MILLIGRAM(S): 5 TABLET ORAL at 05:06

## 2024-08-27 RX ADMIN — Medication 1 PATCH: at 12:33

## 2024-08-27 RX ADMIN — OXYCODONE HYDROCHLORIDE 5 MILLIGRAM(S): 5 TABLET ORAL at 05:36

## 2024-08-27 RX ADMIN — Medication 1 PATCH: at 11:14

## 2024-08-27 RX ADMIN — Medication 1 PATCH: at 07:30

## 2024-08-27 RX ADMIN — Medication 5000 UNIT(S): at 05:05

## 2024-08-27 RX ADMIN — Medication 10 MILLIGRAM(S): at 16:08

## 2024-08-27 RX ADMIN — SODIUM CHLORIDE 4 MILLILITER(S): 9 INJECTION INTRAMUSCULAR; INTRAVENOUS; SUBCUTANEOUS at 21:08

## 2024-08-27 RX ADMIN — Medication 5000 UNIT(S): at 13:32

## 2024-08-27 RX ADMIN — OXYCODONE HYDROCHLORIDE 10 MILLIGRAM(S): 5 TABLET ORAL at 20:20

## 2024-08-27 NOTE — CHART NOTE - NSCHARTNOTEFT_GEN_A_CORE
Pt seen in bed, just arrived from PACU, denies complaints, tolerating liquid    ICU Vital Signs Last 24 Hrs  T(C): 36.7 (23 Aug 2024 16:00), Max: 37 (23 Aug 2024 05:44)  T(F): 98.1 (23 Aug 2024 16:00), Max: 98.6 (23 Aug 2024 05:44)  HR: 67 (23 Aug 2024 16:00) (57 - 71)  BP: 100/61 (23 Aug 2024 16:00) (92/68 - 118/77)  BP(mean): 79 (23 Aug 2024 15:00) (75 - 88)  ABP: --  ABP(mean): --  RR: 18 (23 Aug 2024 16:00) (8 - 28)  SpO2: 99% (23 Aug 2024 16:00) (96% - 100%)2 Parameters below as of 23 Aug 2024 16:00  Patient On (Oxygen Delivery Method): room air      I&O's Detail    23 Aug 2024 07:01  -  23 Aug 2024 17:06  --------------------------------------------------------  IN:    Lactated Ringers: 120 mL    Oral Fluid: 200 mL  Total IN: 320 mL    OUT:    Chest Tube (mL): 35 mL  Total OUT: 35 mL    Total NET: 285 mL      MEDICATIONS  (STANDING):  acetaminophen   IVPB .. 1000 milliGRAM(s) IV Intermittent every 6 hours  heparin   Injectable 5000 Unit(s) SubCutaneous every 8 hours  HYDROmorphone PCA (1 mG/mL) 30 milliLiter(s) PCA Continuous PCA Continuous  insulin lispro (ADMELOG) corrective regimen sliding scale   SubCutaneous at bedtime  insulin lispro (ADMELOG) corrective regimen sliding scale   SubCutaneous three times a day before meals  lactated ringers. 1000 milliLiter(s) (30 mL/Hr) IV Continuous <Continuous>  senna 2 Tablet(s) Oral at bedtime    MEDICATIONS  (PRN):  HYDROmorphone PCA (1 mG/mL) Rescue Clinician Bolus 0.5 milliGRAM(s) IV Push every 15 minutes PRN for Pain Scale GREATER THAN 6  naloxone Injectable 0.1 milliGRAM(s) IV Push every 3 minutes PRN For ANY of the following changes in patient status:  A. RR LESS THAN 10 breaths per minute, B. Oxygen saturation LESS THAN 90%, C. Sedation score of 6  ondansetron Injectable 4 milliGRAM(s) IV Push every 6 hours PRN Nausea    CXR lungs clear, CT in good position, atelectasis left base    Neuro  A&OX3, GRACE  Resp breathing comfortably  Cor SR  Abd soft  CT to sx, drained 35cc, no airleak      HPI:  54 yr old female with  medical hx  T2DM no meds presents for preop evaluation with dx of Localized Enlarged Lymph Nodes.  Patient reported that on her work-up for colonoscopy she went for cxr which resulted abnormal and she was then sent for CT scan.    CT chest on 3/25/2024:  - left lower lobe 4.6x2.6cm wedge shaped opacity with a few air bronchograms may represent pneumonia or possibly pulmonary infract but is nonspecific.  - milder right lower lobe suspected atelectasis or scarring.  ?  CT Chest on 5/13/24:  - scattered areas of perilymphatic micronodularity are identified bilaterally, involving RUL to a lesser extent the TATIANNA, with worse involvement of lower lobes in which there is associated interlocular septal thickening and a coalescent area of patchy consolidation in the LLL  - numerous small calcified bilateral hilar and mediastinal LNs    Patient is now scheduled for Robotic Assisted Right Video Assisted Thoracoscopic Surgery, Wedge Biopsy and Mediastinal Lymph Node Biopsy on 08/23/24. (13 Aug 2024 16:42)      S/P RA, RVATS, RUL/RLL Wedge, mlnd. Doing well  advance diet to reg  due to void, will bladder scan if no void by 7pm  WS chest tube at midnight  am cxr and labs
Patient was to be discharged this evening but attributes some abdominal pain to constipation and would prefer to stay this evening   Given additional medication to help move bowels including a dulcolax suppository  Discussed with Dr. Ambriz, agrees to her staying the night  Pt otherwise stable and should be able to go home pending a BM

## 2024-08-28 ENCOUNTER — TRANSCRIPTION ENCOUNTER (OUTPATIENT)
Age: 54
End: 2024-08-28

## 2024-08-28 VITALS
SYSTOLIC BLOOD PRESSURE: 101 MMHG | DIASTOLIC BLOOD PRESSURE: 61 MMHG | TEMPERATURE: 98 F | OXYGEN SATURATION: 96 % | RESPIRATION RATE: 18 BRPM | HEART RATE: 77 BPM

## 2024-08-28 LAB
CULTURE RESULTS: SIGNIFICANT CHANGE UP
GLUCOSE BLDC GLUCOMTR-MCNC: 111 MG/DL — HIGH (ref 70–99)
HEMATOPATHOLOGY REPORT: SIGNIFICANT CHANGE UP
SPECIMEN SOURCE: SIGNIFICANT CHANGE UP
TM INTERPRETATION: SIGNIFICANT CHANGE UP
TM INTERPRETATION: SIGNIFICANT CHANGE UP

## 2024-08-28 RX ORDER — ACETAMINOPHEN 325 MG/1
2 TABLET ORAL
Qty: 0 | Refills: 0 | DISCHARGE
Start: 2024-08-28

## 2024-08-28 RX ORDER — OXYCODONE HYDROCHLORIDE 5 MG/1
1 TABLET ORAL
Qty: 30 | Refills: 0
Start: 2024-08-28 | End: 2024-09-01

## 2024-08-28 RX ADMIN — SODIUM CHLORIDE 4 MILLILITER(S): 9 INJECTION INTRAMUSCULAR; INTRAVENOUS; SUBCUTANEOUS at 09:51

## 2024-08-28 RX ADMIN — DORNASE ALFA 2.5 MILLIGRAM(S): 1 SOLUTION RESPIRATORY (INHALATION) at 09:50

## 2024-08-28 RX ADMIN — Medication 2.5 MILLIGRAM(S): at 09:49

## 2024-08-28 RX ADMIN — Medication 5000 UNIT(S): at 05:03

## 2024-08-28 NOTE — DISCHARGE NOTE NURSING/CASE MANAGEMENT/SOCIAL WORK - NSDCPNINST_GEN_ALL_CORE
Call MD for follow up appointment. No heavy lifting/straining. Walk 4-5 times per day. Use your incentive spirometer 10x an hour while awake. Notify MD for any signs/symptoms of infection, fever greater than 100.4, redness, swelling and/or drainage. Notify MD for any shortness of breath.  Call MD for follow up appointment. No heavy lifting/straining. Walk 4-5 times per day. Use your incentive spirometer 10x an hour while awake. You may shower just pat the white strips dry, they will fall off on their own. Notify MD for any signs/symptoms of infection, fever greater than 100.4, redness, swelling and/or drainage. Notify MD for any shortness of breath.

## 2024-08-28 NOTE — DISCHARGE NOTE NURSING/CASE MANAGEMENT/SOCIAL WORK - PATIENT PORTAL LINK FT
You can access the FollowMyHealth Patient Portal offered by [facility] by registering at the following website: [email or web address]. By joining [facility]’s FollowMyHealth portal, you will also be able to view your health information using other applications (apps) compatible with our system.

## 2024-08-30 PROBLEM — R59.0 LOCALIZED ENLARGED LYMPH NODES: Chronic | Status: ACTIVE | Noted: 2024-08-13

## 2024-09-10 ENCOUNTER — APPOINTMENT (OUTPATIENT)
Dept: THORACIC SURGERY | Facility: CLINIC | Age: 54
End: 2024-09-10
Payer: COMMERCIAL

## 2024-09-10 ENCOUNTER — APPOINTMENT (OUTPATIENT)
Dept: RADIOLOGY | Facility: CLINIC | Age: 54
End: 2024-09-10
Payer: COMMERCIAL

## 2024-09-10 VITALS
WEIGHT: 166 LBS | DIASTOLIC BLOOD PRESSURE: 79 MMHG | HEART RATE: 72 BPM | BODY MASS INDEX: 29.41 KG/M2 | OXYGEN SATURATION: 98 % | SYSTOLIC BLOOD PRESSURE: 118 MMHG | RESPIRATION RATE: 16 BRPM | HEIGHT: 63 IN

## 2024-09-10 DIAGNOSIS — R59.0 LOCALIZED ENLARGED LYMPH NODES: ICD-10-CM

## 2024-09-10 LAB
CHROM ANALY OVERALL INTERP SPEC-IMP: SIGNIFICANT CHANGE UP
CHROM ANALY OVERALL INTERP SPEC-IMP: SIGNIFICANT CHANGE UP

## 2024-09-10 PROCEDURE — 71046 X-RAY EXAM CHEST 2 VIEWS: CPT

## 2024-09-10 PROCEDURE — 99024 POSTOP FOLLOW-UP VISIT: CPT

## 2024-09-10 NOTE — ASSESSMENT
[FreeTextEntry1] : Ms. SOCORRO LUQUE, 54 year old female, never smoker, with no PMhx who presented lung biopsy, suspect of sarcoidosis.  PFTs on 4/22/24: FVC 96%, FEV1 98%, DLCO 82%  CT chest on 3/25/2024: - left lower lobe 4.6x2.6cm wedge shaped opacity with a few air bronchograms may represent pneumonia or possibly pulmonary infract but is nonspecific. - milder right lower lobe suspected atelectasis or scarring.  CT Chest on 5/13/24: - scattered areas of perilymphatic micronodularity are identified bilaterally, involving RUL to a lesser extent the TATIANNA, with worse involvement of lower lobes in which there is associated interlocular septal thickening and a coalescent area of patchy consolidation in the LLL - numerous small calcified bilateral hilar and mediastinal LNs  Now s/p R.A., Rt VATS, RUL and RLL wedge rxn, MLND on 8/23/24. 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  ***The sections from the lung show diffuse non necrotizing granulomas with fibrosis and a lymphangitic distribution. Focal anthracotic pigment and polarizable silica/silicates are identified as well. The lymph nodes show similar findings. The differential diagnosis includes sarcoidosis and mixed dust exposure or a combination of both. AFB and GMS stains are negative.   CXR today---- reviewed. Pt reports post-op pain, denies SOB, cough or CP.   I have reviewed the patient's medical records and diagnostic images at time of this office consultation and have made the following recommendation: 1. 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 Follow up with Dr. Kirkland, C PRN    I, Dr. Flores, personally performed the evaluation and management (E/M) services for this established patient who presents today with (a) new problem(s)/exacerbation of (an) existing condition(s).  That E/M includes conducting the examination, assessing all new/exacerbated conditions, and establishing a new plan of care.  Today, my ACP, Sammie Melvin, [facility] was here to observe my evaluation and management services for this new problem/exacerbated condition to be followed going forward.

## 2024-09-10 NOTE — ASSESSMENT
[FreeTextEntry1] : Ms. SOCORRO LUQUE, 54 year old female, never smoker, with no PMhx who presented lung biopsy, suspect of sarcoidosis.  PFTs on 4/22/24: FVC 96%, FEV1 98%, DLCO 82%  CT chest on 3/25/2024: - left lower lobe 4.6x2.6cm wedge shaped opacity with a few air bronchograms may represent pneumonia or possibly pulmonary infract but is nonspecific. - milder right lower lobe suspected atelectasis or scarring.  CT Chest on 5/13/24: - scattered areas of perilymphatic micronodularity are identified bilaterally, involving RUL to a lesser extent the TATIANNA, with worse involvement of lower lobes in which there is associated interlocular septal thickening and a coalescent area of patchy consolidation in the LLL - numerous small calcified bilateral hilar and mediastinal LNs  Now s/p R.A., Rt VATS, RUL and RLL wedge rxn, MLND on 8/23/24. 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  ***The sections from the lung show diffuse non necrotizing granulomas with fibrosis and a lymphangitic distribution. Focal anthracotic pigment and polarizable silica/silicates are identified as well. The lymph nodes show similar findings. The differential diagnosis includes sarcoidosis and mixed dust exposure or a combination of both. AFB and GMS stains are negative.   CXR today---- reviewed. Pt reports post-op pain, denies SOB, cough or CP.   I have reviewed the patient's medical records and diagnostic images at time of this office consultation and have made the following recommendation: 1. 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 Follow up with Dr. Kirkland, C PRN    I, Dr. Flores, personally performed the evaluation and management (E/M) services for this established patient who presents today with (a) new problem(s)/exacerbation of (an) existing condition(s).  That E/M includes conducting the examination, assessing all new/exacerbated conditions, and establishing a new plan of care.  Today, my ACP, Sammie Melvin, [facility] was here to observe my evaluation and management services for this new problem/exacerbated condition to be followed going forward.

## 2024-09-10 NOTE — CONSULT LETTER
[Dear  ___] : Dear  [unfilled], [Consult Letter:] : I had the pleasure of evaluating your patient, [unfilled]. [Please see my note below.] : Please see my note below. [Consult Closing:] : Thank you very much for allowing me to participate in the care of this patient.  If you have any questions, please do not hesitate to contact me. [Sincerely,] : Sincerely, [FreeTextEntry2] : Larry Kirkland DO (ref/pulm) [FreeTextEntry3] : Cynthia Flores MD Attending Surgeon Division of Thoracic Surgery , [facility] [employer] at [facility]

## 2024-09-10 NOTE — CONSULT LETTER
[Dear  ___] : Dear  [unfilled], [Consult Letter:] : I had the pleasure of evaluating your patient, [unfilled]. [Please see my note below.] : Please see my note below. [Consult Closing:] : Thank you very much for allowing me to participate in the care of this patient.  If you have any questions, please do not hesitate to contact me. [Sincerely,] : Sincerely, [FreeTextEntry2] : Larry Kirkland DO (ref/pulm) [FreeTextEntry3] : Cynthia Flores MD Attending Surgeon Division of Thoracic Surgery , [facility] [employer] at [facility]

## 2024-09-21 LAB
CULTURE RESULTS: SIGNIFICANT CHANGE UP
SPECIMEN SOURCE: SIGNIFICANT CHANGE UP

## 2024-10-22 ENCOUNTER — APPOINTMENT (OUTPATIENT)
Dept: RADIOLOGY | Facility: CLINIC | Age: 54
End: 2024-10-22
Payer: COMMERCIAL

## 2024-10-22 ENCOUNTER — APPOINTMENT (OUTPATIENT)
Dept: THORACIC SURGERY | Facility: CLINIC | Age: 54
End: 2024-10-22
Payer: COMMERCIAL

## 2024-10-22 VITALS
DIASTOLIC BLOOD PRESSURE: 77 MMHG | BODY MASS INDEX: 29.41 KG/M2 | SYSTOLIC BLOOD PRESSURE: 125 MMHG | OXYGEN SATURATION: 97 % | HEIGHT: 63 IN | RESPIRATION RATE: 16 BRPM | HEART RATE: 80 BPM | WEIGHT: 166 LBS

## 2024-10-22 DIAGNOSIS — R59.0 LOCALIZED ENLARGED LYMPH NODES: ICD-10-CM

## 2024-10-22 PROCEDURE — 71046 X-RAY EXAM CHEST 2 VIEWS: CPT

## 2024-10-22 PROCEDURE — 99024 POSTOP FOLLOW-UP VISIT: CPT

## 2024-11-08 ENCOUNTER — APPOINTMENT (OUTPATIENT)
Dept: PULMONOLOGY | Facility: CLINIC | Age: 54
End: 2024-11-08
Payer: COMMERCIAL

## 2024-11-08 VITALS — WEIGHT: 170 LBS | HEIGHT: 63 IN | BODY MASS INDEX: 30.12 KG/M2

## 2024-11-08 VITALS — HEART RATE: 71 BPM | OXYGEN SATURATION: 96 % | DIASTOLIC BLOOD PRESSURE: 64 MMHG | SYSTOLIC BLOOD PRESSURE: 109 MMHG

## 2024-11-08 DIAGNOSIS — R59.0 LOCALIZED ENLARGED LYMPH NODES: ICD-10-CM

## 2024-11-08 PROCEDURE — 99214 OFFICE O/P EST MOD 30 MIN: CPT | Mod: 25

## 2024-11-08 PROCEDURE — 94727 GAS DIL/WSHOT DETER LNG VOL: CPT

## 2024-11-08 PROCEDURE — 94729 DIFFUSING CAPACITY: CPT

## 2024-11-08 PROCEDURE — 94010 BREATHING CAPACITY TEST: CPT

## 2024-11-08 PROCEDURE — ZZZZZ: CPT

## 2024-11-08 RX ORDER — GABAPENTIN 100 MG/1
100 CAPSULE ORAL
Qty: 30 | Refills: 0 | Status: ACTIVE | COMMUNITY
Start: 2024-11-08 | End: 1900-01-01

## 2024-12-06 ENCOUNTER — APPOINTMENT (OUTPATIENT)
Dept: PULMONOLOGY | Facility: CLINIC | Age: 54
End: 2024-12-06
Payer: COMMERCIAL

## 2024-12-06 VITALS
HEART RATE: 66 BPM | SYSTOLIC BLOOD PRESSURE: 106 MMHG | OXYGEN SATURATION: 98 % | BODY MASS INDEX: 30.65 KG/M2 | WEIGHT: 173 LBS | DIASTOLIC BLOOD PRESSURE: 68 MMHG | RESPIRATION RATE: 16 BRPM

## 2024-12-06 DIAGNOSIS — R59.0 LOCALIZED ENLARGED LYMPH NODES: ICD-10-CM

## 2024-12-06 PROCEDURE — G2211 COMPLEX E/M VISIT ADD ON: CPT | Mod: NC

## 2024-12-06 PROCEDURE — 36415 COLL VENOUS BLD VENIPUNCTURE: CPT

## 2024-12-06 PROCEDURE — 99214 OFFICE O/P EST MOD 30 MIN: CPT

## 2024-12-09 ENCOUNTER — NON-APPOINTMENT (OUTPATIENT)
Age: 54
End: 2024-12-09

## 2024-12-10 LAB
25(OH)D3 SERPL-MCNC: 34.8 NG/ML
ALBUMIN SERPL ELPH-MCNC: 4.4 G/DL
ALP BLD-CCNC: 74 U/L
ALT SERPL-CCNC: 11 U/L
ANION GAP SERPL CALC-SCNC: 14 MMOL/L
AST SERPL-CCNC: 18 U/L
BILIRUB SERPL-MCNC: 0.2 MG/DL
BUN SERPL-MCNC: 10 MG/DL
CALCIUM SERPL-MCNC: 9.9 MG/DL
CHLORIDE SERPL-SCNC: 103 MMOL/L
CO2 SERPL-SCNC: 23 MMOL/L
CREAT SERPL-MCNC: 0.64 MG/DL
EGFR: 106 ML/MIN/1.73M2
GLUCOSE SERPL-MCNC: 104 MG/DL
POTASSIUM SERPL-SCNC: 4.5 MMOL/L
PROT SERPL-MCNC: 7.6 G/DL
SODIUM SERPL-SCNC: 139 MMOL/L

## 2025-01-06 ENCOUNTER — NON-APPOINTMENT (OUTPATIENT)
Age: 55
End: 2025-01-06

## 2025-01-28 ENCOUNTER — APPOINTMENT (OUTPATIENT)
Dept: THORACIC SURGERY | Facility: CLINIC | Age: 55
End: 2025-01-28

## 2025-01-28 ENCOUNTER — NON-APPOINTMENT (OUTPATIENT)
Age: 55
End: 2025-01-28

## 2025-04-23 ENCOUNTER — NON-APPOINTMENT (OUTPATIENT)
Age: 55
End: 2025-04-23

## 2025-04-24 ENCOUNTER — APPOINTMENT (OUTPATIENT)
Dept: PULMONOLOGY | Facility: CLINIC | Age: 55
End: 2025-04-24

## 2025-06-04 NOTE — DISCHARGE NOTE PROVIDER - NSDCCPTREATMENT_GEN_ALL_CORE_FT
PRINCIPAL PROCEDURE  Procedure: Wedge resection, lung, robot-assisted, using VATS  Findings and Treatment:      100

## 2025-07-07 ENCOUNTER — APPOINTMENT (OUTPATIENT)
Dept: PULMONOLOGY | Facility: CLINIC | Age: 55
End: 2025-07-07

## 2025-07-14 ENCOUNTER — APPOINTMENT (OUTPATIENT)
Dept: PULMONOLOGY | Facility: CLINIC | Age: 55
End: 2025-07-14
Payer: COMMERCIAL

## 2025-07-14 VITALS
OXYGEN SATURATION: 97 % | BODY MASS INDEX: 29.76 KG/M2 | WEIGHT: 168 LBS | SYSTOLIC BLOOD PRESSURE: 127 MMHG | HEART RATE: 66 BPM | DIASTOLIC BLOOD PRESSURE: 67 MMHG

## 2025-07-14 LAB — POCT - HEMOGLOBIN (HGB), QUANTITATIVE, TRANSCUTANEOUS: 10.5

## 2025-07-14 PROCEDURE — 94010 BREATHING CAPACITY TEST: CPT

## 2025-07-14 PROCEDURE — 88738 HGB QUANT TRANSCUTANEOUS: CPT

## 2025-07-14 PROCEDURE — ZZZZZ: CPT

## 2025-07-14 PROCEDURE — 94729 DIFFUSING CAPACITY: CPT

## 2025-07-14 PROCEDURE — 94727 GAS DIL/WSHOT DETER LNG VOL: CPT

## 2025-07-14 PROCEDURE — 99214 OFFICE O/P EST MOD 30 MIN: CPT | Mod: 25

## 2025-07-14 PROCEDURE — 71046 X-RAY EXAM CHEST 2 VIEWS: CPT

## 2025-09-17 ENCOUNTER — APPOINTMENT (OUTPATIENT)
Dept: PULMONOLOGY | Facility: CLINIC | Age: 55
End: 2025-09-17
Payer: COMMERCIAL

## 2025-09-17 VITALS — DIASTOLIC BLOOD PRESSURE: 65 MMHG | SYSTOLIC BLOOD PRESSURE: 98 MMHG | HEART RATE: 83 BPM | OXYGEN SATURATION: 94 %

## 2025-09-17 DIAGNOSIS — R93.89 ABNORMAL FINDINGS ON DIAGNOSTIC IMAGING OF OTHER SPECIFIED BODY STRUCTURES: ICD-10-CM

## 2025-09-17 PROCEDURE — G2211 COMPLEX E/M VISIT ADD ON: CPT | Mod: NC

## 2025-09-17 PROCEDURE — 99214 OFFICE O/P EST MOD 30 MIN: CPT

## (undated) DEVICE — STAPLER COVIDIEN ENDO GIA STANDARD HANDLE

## (undated) DEVICE — WARMING BLANKET LOWER ADULT

## (undated) DEVICE — POSITIONER FOAM HEAD CRADLE (PINK)

## (undated) DEVICE — DRSG GAUZE PACKTNER ROLL

## (undated) DEVICE — XI ARM GRASPER BIPOLAR LONG 8MM

## (undated) DEVICE — BLADE SURGICAL #10 STAINLESS

## (undated) DEVICE — GRASPER LAPA 5MMX35CM

## (undated) DEVICE — ENDOCATCH GENERAL 15MM (PURPLE)

## (undated) DEVICE — TUBING STRYKEFLOW II SUCTION / IRRIGATOR

## (undated) DEVICE — GOWN XL

## (undated) DEVICE — SUT MONOCRYL 4-0 27" PS-2 UNDYED

## (undated) DEVICE — XI SEAL UNIVERSIAL 5-12MM

## (undated) DEVICE — NDL HYPO REGULAR BEVEL 22G X 1.5" (TURQUOISE)

## (undated) DEVICE — TROCAR SURGIQUEST AIRSEAL 12MMX100MM

## (undated) DEVICE — TUBING AIRSEAL TRI-LUMEN FILTERED

## (undated) DEVICE — XI ARM FORCEP FENESTRATED BIPOLAR 8MM

## (undated) DEVICE — XI DRAPE ARM

## (undated) DEVICE — ELCTR BOVIE TIP BLADE INSULATED 2.75" EDGE

## (undated) DEVICE — LUBRICANT INST ELECTROLUBE Z SOLUTION

## (undated) DEVICE — XI DRAPE COLUMN

## (undated) DEVICE — SUT VICRYL 0 27" UR-6

## (undated) DEVICE — PACK ROBOTIC LIJ

## (undated) DEVICE — XI OBTURATOR OPTICAL BLADELESS 8MM

## (undated) DEVICE — XI ARM GRASPER TIP UP FENESTRATED

## (undated) DEVICE — ELCTR BOVIE PENCIL SMOKE EVACUATION

## (undated) DEVICE — SOL IRR POUR NS 0.9% 500ML

## (undated) DEVICE — ENDOCATCH GENERAL 10MM (PURPLE)

## (undated) DEVICE — D HELP - CLEARVIEW CLEARIFY SYSTEM

## (undated) DEVICE — XI ARM CLIP APPLIER MEDIUM-LARGE

## (undated) DEVICE — SUT SILK 0 24" SH DA

## (undated) DEVICE — DRSG TEGADERM 2.5X3"

## (undated) DEVICE — WARMING BLANKET UPPER ADULT

## (undated) DEVICE — VENODYNE/SCD SLEEVE CALF MEDIUM